# Patient Record
Sex: FEMALE | Race: WHITE | NOT HISPANIC OR LATINO | Employment: OTHER | URBAN - METROPOLITAN AREA
[De-identification: names, ages, dates, MRNs, and addresses within clinical notes are randomized per-mention and may not be internally consistent; named-entity substitution may affect disease eponyms.]

---

## 2017-01-13 ENCOUNTER — OFFICE VISIT (OUTPATIENT)
Dept: ORTHOPEDIC SURGERY | Facility: CLINIC | Age: 72
End: 2017-01-13

## 2017-01-13 VITALS — HEIGHT: 67 IN | TEMPERATURE: 97.8 F | WEIGHT: 133 LBS | BODY MASS INDEX: 20.88 KG/M2

## 2017-01-13 DIAGNOSIS — M25.511 CHRONIC RIGHT SHOULDER PAIN: Primary | ICD-10-CM

## 2017-01-13 DIAGNOSIS — G89.29 CHRONIC RIGHT SHOULDER PAIN: Primary | ICD-10-CM

## 2017-01-13 PROCEDURE — 73030 X-RAY EXAM OF SHOULDER: CPT | Performed by: ORTHOPAEDIC SURGERY

## 2017-01-13 PROCEDURE — 73010 X-RAY EXAM OF SHOULDER BLADE: CPT | Performed by: ORTHOPAEDIC SURGERY

## 2017-01-13 PROCEDURE — 99202 OFFICE O/P NEW SF 15 MIN: CPT | Performed by: ORTHOPAEDIC SURGERY

## 2017-01-13 RX ORDER — METHOCARBAMOL 500 MG/1
500 TABLET, FILM COATED ORAL 4 TIMES DAILY
COMMUNITY
End: 2019-05-14

## 2017-01-13 NOTE — MR AVS SNAPSHOT
Tracey Vines   1/13/2017 11:00 AM   Office Visit    Dept Phone:  [phone]   Encounter #:  [id number]    Provider:  Jonas Nolen MD   Department:  [facility]                Your Full Care Plan              Your Updated Medication List          This list is accurate as of: 1/13/17 11:47 AM.  Always use your most recent med list.                ALPRAZolam 0.25 MG tablet   Commonly known as:  XANAX       atorvastatin 40 MG tablet   Commonly known as:  LIPITOR       carvedilol 12.5 MG tablet   Commonly known as:  COREG       cefdinir 300 MG capsule   Commonly known as:  OMNICEF   2 capsules (at the same time) once a day       digoxin 125 MCG tablet   Commonly known as:  LANOXIN       furosemide 10 MG/ML solution   Commonly known as:  LASIX       losartan 25 MG tablet   Commonly known as:  COZAAR       methocarbamol 500 MG tablet   Commonly known as:  ROBAXIN       spironolactone 25 MG tablet   Commonly known as:  ALDACTONE       venlafaxine  MG 24 hr capsule   Commonly known as:  EFFEXOR-XR       warfarin 2.5 MG tablet   Commonly known as:  COUMADIN               You Were Diagnosed With        Codes Comments    Chronic right shoulder pain    -  Primary ICD-10-CM: M25.511, G89.29  ICD-9-CM: 719.41, 338.29       Instructions     None    Patient Instructions History      Upcoming Appointments     Visit Type Date Time Department    NEW PATIENT 1/13/2017 11:00 AM [facility]    PAP SMEAR/PELVIC EXAM 4/6/2017  1:30 PM [facility]      [employer] Signup     Our records indicate that you have an active [facility] account.    You can view your After Visit Summary by going to [employer] and logging in with your [employer] username and password.  If you don't have a [employer] username and password but a parent or guardian has access to your record, the parent or guardian should login with their own  "[employer] username and password and access your record to view the After Visit Summary.    If you have questions, you can email [email or web address] or call [phone] to talk to our [employer] staff.  Remember, [employer] is NOT to be used for urgent needs.  For medical emergencies, dial 911.               Other Info from Your Visit           Your Appointments     Apr 06, 2017  1:30 PM EDT   Pap Smear/Pelvic Exam with Thomas Rand MD   [facility] (--)    [address] [place] [id number] [phone]              Allergies     No Known Allergies      Reason for Visit     Right Shoulder - Pain           Vital Signs     Temperature Height Weight Last Menstrual Period Body Mass Index Smoking Status    97.8 °F (36.6 °C) 67\" (170.2 cm) 133 lb (60.3 kg) (LMP Unknown) 20.83 kg/m2 Former Smoker      Problems and Diagnoses Noted     Chronic right shoulder pain    -  Primary        "

## 2017-01-15 NOTE — PROGRESS NOTES
Patient: Tracey Vines    YOB: 1945    Medical Record Number: [id number]    Chief Complaints:  Right shoulder pain    History of Present Illness:     71 y.o. female patient who presents for evaluation of her right shoulder.  She tells me she was lifting a crock pot on May 6 when she felt a pop in the front of her right shoulder.  She localizes this pain to the area over the biceps.  He tells me that when she may be a performance she is still having discomfort but that pain is now completely resolved.  She tells me that muscle relaxers seem to help quite a bit.  At present she is asymptomatic.  Denies any complaints or issues whatsoever.    Allergies: No Known Allergies    Home Medications:      Current Outpatient Prescriptions:   •  ALPRAZolam (XANAX) 0.25 MG tablet, Take 0.25 mg by mouth 2 (two) times a day as needed for anxiety., Disp: , Rfl:   •  atorvastatin (LIPITOR) 40 MG tablet, Take 40 mg by mouth daily., Disp: , Rfl:   •  carvedilol (COREG) 12.5 MG tablet, Take 12.5 mg by mouth 2 (two) times a day with meals., Disp: , Rfl:   •  cefdinir (OMNICEF) 300 MG capsule, 2 capsules (at the same time) once a day, Disp: 20 capsule, Rfl: 0  •  digoxin (LANOXIN) 125 MCG tablet, Take 125 mcg by mouth every day., Disp: , Rfl:   •  furosemide (LASIX) 10 MG/ML solution, Take  by mouth daily., Disp: , Rfl:   •  losartan (COZAAR) 25 MG tablet, Take 25 mg by mouth daily., Disp: , Rfl:   •  methocarbamol (ROBAXIN) 500 MG tablet, Take 500 mg by mouth 4 (Four) Times a Day., Disp: , Rfl:   •  spironolactone (ALDACTONE) 25 MG tablet, Take 25 mg by mouth daily., Disp: , Rfl:   •  venlafaxine XR (EFFEXOR-XR) 150 MG 24 hr capsule, Take 150 mg by mouth daily., Disp: , Rfl:   •  warfarin (COUMADIN) 2.5 MG tablet, Take 2.5 mg by mouth daily., Disp: , Rfl:     Past Medical History   Diagnosis Date   • A-fib    • CHF (congestive heart failure)    • Depression    • Disease of thyroid gland    • Hyperlipidemia    •  "Hypertension        Past Surgical History   Procedure Laterality Date   • Thyroid surgery     • Cardiac surgery     • Tonsillectomy     • Cholecystectomy         Social History     Occupational History   • Not on file.     Social History Main Topics   • Smoking status: Former Smoker   • Smokeless tobacco: Not on file   • Alcohol use No   • Drug use: No   • Sexual activity: Not on file      Social History     Social History Narrative       Family History   Problem Relation Age of Onset   • Diabetes Mother    • Heart failure Mother    • Stroke Mother    • Cancer Father        Review of Systems:      Constitutional: Denies fever, shaking or chills   Eyes: Denies change in visual acuity   HEENT: Denies nasal congestion or sore throat   Respiratory: Denies cough or shortness of breath   Cardiovascular: Denies chest pain or edema  Endocrine: Denies tremors, palpitations, intolerance of heat or cold, polyuria, polydipsia.  GI: Denies abdominal pain, nausea, vomiting, bloody stools or diarrhea  : Denies frequency, urgency, incontinence, retention, or nocturia.  Musculoskeletal: Denies numbness tingling or loss of motor function except as above  Integument: Denies rash, lesion or ulceration   Neurologic: Denies headache or focal weakness, deficits  Heme: Denies epistaxis, spontaneous or excessive bleeding, epistaxis, hematuria, melena, fatigue, enlarged or tender lymph nodes.      All other pertinent positives and negatives as noted above in HPI.      Physical Exam: 71 y.o. female    Vitals:    01/13/17 1120   Temp: 97.8 °F (36.6 °C)   Weight: 133 lb (60.3 kg)   Height: 67\" (170.2 cm)       General:  Patient is awake and alert.  Appears in no acute distress or discomfort.    Psych:  Affect and demeanor are appropriate.    Eyes:  Conjunctiva and sclera appear grossly normal.  Eyes track well and EOM seem to be intact.    Ears:  No gross abnormalities.  Hearing adequate for the exam.    Cardiovascular:  Regular rate and " rhythm.    Lungs:  Good chest expansion.  Breathing unlabored.    Lymph:  No palpable masses or adenopathy in the affected extremity    Extremities:  Right shoulder is examined.  Skin is benign.  No swellings or masses.  No tenderness.  Full motion.  No instability.  Good strength with elevation and abduction.  Intact sensation throughout the arm and hand.  Palpable radial pulse         Radiology:   AP, scapular Y, and axillary views of the right shoulder are ordered by myself and reviewed to evaluate the patient's complaint.  No comparison films are immediately available.  The x-rays show no obvious acute abnormalities, lesions, masses, significant degenerative changes, or other concerning findings.  The acromiohumeral interval is normal.  Glenoid version appears normal as well.      Assessment/Plan:  Right shoulder long head of biceps tendon rupture    My best guess is that she ruptured her biceps.  There is no Rubens deformity and her symptoms are completely resolved.  I will release her to follow-up as needed.    Jonas Nolen MD    01/13/2017    CC to Serina Butcher MD

## 2017-04-06 ENCOUNTER — PROCEDURE VISIT (OUTPATIENT)
Dept: OBSTETRICS AND GYNECOLOGY | Age: 72
End: 2017-04-06

## 2017-04-06 VITALS
BODY MASS INDEX: 24.27 KG/M2 | HEIGHT: 63 IN | DIASTOLIC BLOOD PRESSURE: 64 MMHG | WEIGHT: 137 LBS | SYSTOLIC BLOOD PRESSURE: 100 MMHG

## 2017-04-06 DIAGNOSIS — Z12.4 SCREENING FOR CERVICAL CANCER: ICD-10-CM

## 2017-04-06 DIAGNOSIS — E78.00 HYPERCHOLESTEREMIA: ICD-10-CM

## 2017-04-06 DIAGNOSIS — B97.7 HPV IN FEMALE: Primary | ICD-10-CM

## 2017-04-06 DIAGNOSIS — I10 ESSENTIAL HYPERTENSION: ICD-10-CM

## 2017-04-06 DIAGNOSIS — R87.612 LOW GRADE SQUAMOUS INTRAEPITHELIAL LESION ON CYTOLOGIC SMEAR OF CERVIX (LGSIL): ICD-10-CM

## 2017-04-06 PROCEDURE — 99212 OFFICE O/P EST SF 10 MIN: CPT | Performed by: OBSTETRICS & GYNECOLOGY

## 2017-04-06 NOTE — PROGRESS NOTES
Subjective   Tracey Vines is a 72 y.o. female is being seen today for   Chief Complaint   Patient presents with   • Gynecologic Exam     PT HERE FOR 6 MO REP PAP.   .    History of Present Illness  Patient is here for repeat Pap smear.  Last Pap was essentially level to slightly better so get a Pap today.  No other problems or complaints.  She turns 72 in both her sons through the lower part T for her  The following portions of the patient's history were reviewed and updated as appropriate: allergies, current medications, past family history, past medical history, past social history, past surgical history and problem list.    PAST MEDICAL HISTORY  Past Medical History:   Diagnosis Date   • A-fib    • CHF (congestive heart failure)    • Depression    • Disease of thyroid gland    • Hyperlipidemia    • Hypertension      OB History     No data available        Past Surgical History:   Procedure Laterality Date   • CARDIAC SURGERY     • CHOLECYSTECTOMY     • THYROID SURGERY     • TONSILLECTOMY       Family History   Problem Relation Age of Onset   • Diabetes Mother    • Heart failure Mother    • Stroke Mother    • Cancer Father      History   Smoking Status   • Former Smoker   Smokeless Tobacco   • Not on file       Current Outpatient Prescriptions:   •  ALPRAZolam (XANAX) 0.25 MG tablet, Take 0.25 mg by mouth 2 (two) times a day as needed for anxiety., Disp: , Rfl:   •  atorvastatin (LIPITOR) 40 MG tablet, Take 40 mg by mouth daily., Disp: , Rfl:   •  carvedilol (COREG) 12.5 MG tablet, Take 12.5 mg by mouth 2 (two) times a day with meals., Disp: , Rfl:   •  cefdinir (OMNICEF) 300 MG capsule, 2 capsules (at the same time) once a day, Disp: 20 capsule, Rfl: 0  •  digoxin (LANOXIN) 125 MCG tablet, Take 125 mcg by mouth every day., Disp: , Rfl:   •  furosemide (LASIX) 10 MG/ML solution, Take  by mouth daily., Disp: , Rfl:   •  losartan (COZAAR) 25 MG tablet, Take 25 mg by mouth daily., Disp: , Rfl:   •  methocarbamol  (ROBAXIN) 500 MG tablet, Take 500 mg by mouth 4 (Four) Times a Day., Disp: , Rfl:   •  spironolactone (ALDACTONE) 25 MG tablet, Take 25 mg by mouth daily., Disp: , Rfl:   •  venlafaxine XR (EFFEXOR-XR) 150 MG 24 hr capsule, Take 150 mg by mouth daily., Disp: , Rfl:   •  warfarin (COUMADIN) 2.5 MG tablet, Take 2.5 mg by mouth daily., Disp: , Rfl:   Immunization History   Administered Date(s) Administered   • TD Preservative Free 05/06/2016       Review of Systems  Negative  Objective   Physical Exam  Well-developed well-nourished white female no acute distress    Assessment/Plan   Tracey was seen today for gynecologic exam.    Diagnoses and all orders for this visit:    HPV in female  -     IGP, Aptima HPV, Rfx 16 / 18,45    Screening for cervical cancer  -     IGP, Aptima HPV, Rfx 16 / 18,45    Hypercholesteremia    Essential hypertension    Low grade squamous intraepithelial lesion on cytologic smear of cervix (lgsil)      Pap smear done return in 6 months

## 2017-04-08 LAB
CONV .: NORMAL
CYTOLOGIST CVX/VAG CYTO: NORMAL
CYTOLOGY CVX/VAG DOC THIN PREP: NORMAL
DX ICD CODE: NORMAL
HIV 1 & 2 AB SER-IMP: NORMAL
OTHER STN SPEC: NORMAL
PATH REPORT.FINAL DX SPEC: NORMAL
STAT OF ADQ CVX/VAG CYTO-IMP: NORMAL

## 2017-10-10 ENCOUNTER — OFFICE VISIT (OUTPATIENT)
Dept: OBSTETRICS AND GYNECOLOGY | Age: 72
End: 2017-10-10

## 2017-10-10 VITALS — DIASTOLIC BLOOD PRESSURE: 64 MMHG | WEIGHT: 134.2 LBS | BODY MASS INDEX: 23.77 KG/M2 | SYSTOLIC BLOOD PRESSURE: 122 MMHG

## 2017-10-10 DIAGNOSIS — N87.1 MODERATE DYSPLASIA OF CERVIX: Primary | ICD-10-CM

## 2017-10-10 DIAGNOSIS — R87.612 LOW GRADE SQUAMOUS INTRAEPITHELIAL LESION ON CYTOLOGIC SMEAR OF CERVIX (LGSIL): ICD-10-CM

## 2017-10-10 PROCEDURE — 99212 OFFICE O/P EST SF 10 MIN: CPT | Performed by: OBSTETRICS & GYNECOLOGY

## 2017-10-10 RX ORDER — POTASSIUM CHLORIDE 750 MG/1
TABLET, EXTENDED RELEASE ORAL
Refills: 3 | COMMUNITY
Start: 2017-08-25

## 2017-10-10 NOTE — PROGRESS NOTES
Subjective   Tracey Vines is a 72 y.o. female is being seen today for a repeat pap.  Chief Complaint   Patient presents with   • Follow-up     Repeat pap   .    History of Present Illness  Patient is here for repeat Pap smear.  Last Pap was negative this she has had mild dysplasia fairly recently.  She has no other problems or questions today.  The following portions of the patient's history were reviewed and updated as appropriate: allergies, current medications, past family history, past medical history, past social history, past surgical history and problem list.    Vitals:    10/10/17 1409   BP: 122/64         PAST MEDICAL HISTORY  Past Medical History:   Diagnosis Date   • A-fib    • CHF (congestive heart failure)    • Depression    • Disease of thyroid gland    • Hyperlipidemia    • Hypertension      OB History     No data available        Past Surgical History:   Procedure Laterality Date   • CARDIAC SURGERY     • CHOLECYSTECTOMY     • THYROID SURGERY     • TONSILLECTOMY       Family History   Problem Relation Age of Onset   • Diabetes Mother    • Heart failure Mother    • Stroke Mother    • Cancer Father      History   Smoking Status   • Former Smoker   Smokeless Tobacco   • Never Used       Current Outpatient Prescriptions:   •  ALPRAZolam (XANAX) 0.25 MG tablet, Take 0.25 mg by mouth 2 (two) times a day as needed for anxiety., Disp: , Rfl:   •  atorvastatin (LIPITOR) 40 MG tablet, Take 40 mg by mouth daily., Disp: , Rfl:   •  carvedilol (COREG) 12.5 MG tablet, Take 12.5 mg by mouth 2 (two) times a day with meals., Disp: , Rfl:   •  cefdinir (OMNICEF) 300 MG capsule, 2 capsules (at the same time) once a day, Disp: 20 capsule, Rfl: 0  •  digoxin (LANOXIN) 125 MCG tablet, Take 125 mcg by mouth every day., Disp: , Rfl:   •  furosemide (LASIX) 10 MG/ML solution, Take  by mouth daily., Disp: , Rfl:   •  losartan (COZAAR) 25 MG tablet, Take 25 mg by mouth daily., Disp: , Rfl:   •  methocarbamol (ROBAXIN) 500 MG  tablet, Take 500 mg by mouth 4 (Four) Times a Day., Disp: , Rfl:   •  potassium chloride (K-DUR,KLOR-CON) 10 MEQ CR tablet, TK 1 T PO QD, Disp: , Rfl: 3  •  spironolactone (ALDACTONE) 25 MG tablet, Take 25 mg by mouth daily., Disp: , Rfl:   •  venlafaxine XR (EFFEXOR-XR) 150 MG 24 hr capsule, Take 150 mg by mouth daily., Disp: , Rfl:   •  warfarin (COUMADIN) 2.5 MG tablet, Take 2.5 mg by mouth daily., Disp: , Rfl:   Immunization History   Administered Date(s) Administered   • TD Preservative Free 05/06/2016       Review of Systems  Negative  Objective   Physical Exam  Pap smear done    Assessment/Plan   Tracey was seen today for follow-up.    Diagnoses and all orders for this visit:    Moderate dysplasia of cervix  -     Pap IG (Image Guided) - ThinPrep Vial, Cervix    Low grade squamous intraepithelial lesion on cytologic smear of cervix (LGSIL)    We will also plan to get a mammogram today and I'll have her come back in year if this Pap is normal.

## 2017-10-12 LAB
CYTOLOGIST CVX/VAG CYTO: NORMAL
CYTOLOGY CVX/VAG DOC THIN PREP: NORMAL
DX ICD CODE: NORMAL
HIV 1 & 2 AB SER-IMP: NORMAL
OTHER STN SPEC: NORMAL
PATH REPORT.FINAL DX SPEC: NORMAL
STAT OF ADQ CVX/VAG CYTO-IMP: NORMAL

## 2017-10-13 ENCOUNTER — TELEPHONE (OUTPATIENT)
Dept: OBSTETRICS AND GYNECOLOGY | Age: 72
End: 2017-10-13

## 2017-10-13 NOTE — TELEPHONE ENCOUNTER
----- Message from Thomas Rand MD sent at 10/12/2017  4:17 PM EDT -----  Tell patient Pap normal see back in 1 year

## 2018-05-22 ENCOUNTER — TELEPHONE (OUTPATIENT)
Dept: OBSTETRICS AND GYNECOLOGY | Age: 73
End: 2018-05-22

## 2018-05-22 RX ORDER — METRONIDAZOLE 7.5 MG/G
GEL VAGINAL DAILY
Qty: 1 TUBE | Refills: 0 | Status: SHIPPED | OUTPATIENT
Start: 2018-05-22 | End: 2018-05-27

## 2018-10-16 ENCOUNTER — OFFICE VISIT (OUTPATIENT)
Dept: OBSTETRICS AND GYNECOLOGY | Age: 73
End: 2018-10-16

## 2018-10-16 VITALS
DIASTOLIC BLOOD PRESSURE: 60 MMHG | SYSTOLIC BLOOD PRESSURE: 104 MMHG | BODY MASS INDEX: 21.35 KG/M2 | WEIGHT: 136 LBS | HEIGHT: 67 IN

## 2018-10-16 DIAGNOSIS — N95.0 POSTMENOPAUSAL BLEEDING: ICD-10-CM

## 2018-10-16 DIAGNOSIS — N87.9 CERVICAL DYSPLASIA: ICD-10-CM

## 2018-10-16 DIAGNOSIS — R87.613 HIGH GRADE SQUAMOUS INTRAEPITHELIAL CERVICAL DYSPLASIA: ICD-10-CM

## 2018-10-16 DIAGNOSIS — Z00.00 ANNUAL PHYSICAL EXAM: ICD-10-CM

## 2018-10-16 DIAGNOSIS — Z11.51 SCREENING FOR HPV (HUMAN PAPILLOMAVIRUS): ICD-10-CM

## 2018-10-16 DIAGNOSIS — E03.9 ACQUIRED HYPOTHYROIDISM: Primary | ICD-10-CM

## 2018-10-16 DIAGNOSIS — Z01.419 WELL WOMAN EXAM WITH ROUTINE GYNECOLOGICAL EXAM: ICD-10-CM

## 2018-10-16 PROBLEM — I35.1 AORTIC REGURGITATION: Status: ACTIVE | Noted: 2018-10-16

## 2018-10-16 PROBLEM — Z98.890 H/O MITRAL VALVE REPAIR: Status: ACTIVE | Noted: 2017-05-24

## 2018-10-16 PROBLEM — I10 BENIGN ESSENTIAL HTN: Status: ACTIVE | Noted: 2018-10-16

## 2018-10-16 PROBLEM — I27.20 PULMONARY HYPERTENSION (HCC): Status: ACTIVE | Noted: 2018-10-16

## 2018-10-16 PROBLEM — I77.810 DILATED AORTIC ROOT (HCC): Status: ACTIVE | Noted: 2018-10-16

## 2018-10-16 PROBLEM — I25.10 CORONARY ARTERY DISEASE: Status: ACTIVE | Noted: 2018-10-16

## 2018-10-16 PROBLEM — I65.29 CAROTID ARTERY STENOSIS: Status: ACTIVE | Noted: 2018-10-16

## 2018-10-16 PROBLEM — D64.9 ANEMIA: Status: ACTIVE | Noted: 2018-10-16

## 2018-10-16 PROBLEM — Z95.5 HISTORY OF CORONARY ARTERY STENT PLACEMENT: Status: ACTIVE | Noted: 2017-05-24

## 2018-10-16 PROBLEM — Z98.890 H/O TRICUSPID VALVE REPAIR: Status: ACTIVE | Noted: 2017-05-24

## 2018-10-16 PROBLEM — I77.9 CAROTID ARTERY DISEASE (HCC): Status: ACTIVE | Noted: 2018-10-16

## 2018-10-16 PROCEDURE — 99397 PER PM REEVAL EST PAT 65+ YR: CPT | Performed by: OBSTETRICS & GYNECOLOGY

## 2018-10-16 RX ORDER — INFLUENZA A VIRUS A/MICHIGAN/45/2015 X-275 (H1N1) ANTIGEN (FORMALDEHYDE INACTIVATED), INFLUENZA A VIRUS A/SINGAPORE/INFIMH-16-0019/2016 IVR-186 (H3N2) ANTIGEN (FORMALDEHYDE INACTIVATED), AND INFLUENZA B VIRUS B/MARYLAND/15/2016 BX-69A (A B/COLORADO/6/2017-LIKE VIRUS) ANTIGEN (FORMALDEHYDE INACTIVATED) 60; 60; 60 UG/.5ML; UG/.5ML; UG/.5ML
INJECTION, SUSPENSION INTRAMUSCULAR
Refills: 0 | COMMUNITY
Start: 2018-10-09 | End: 2019-05-14

## 2018-10-16 RX ORDER — LEVOTHYROXINE SODIUM 112 UG/1
112 TABLET ORAL DAILY
COMMUNITY
Start: 2018-01-08 | End: 2019-09-16 | Stop reason: ALTCHOICE

## 2018-10-16 NOTE — PROGRESS NOTES
Subjective   Tracey Vines is a 73 y.o. female is being seen today for   Chief Complaint   Patient presents with   • Gynecologic Exam     AE today.  2017, neg pap, neg HR-HPV (hx of LGSIL).  MG is due.  PM no HRT. C-scope 2016.    .    History of Present Illness patient is here for annual check and a repeat Pap smear.  She has a history of abnormal Paps and HPV the last one to 2 have been normal.  Complaint is the last 4 months she's had lid little bit of a bloody discharge.  No cramps no other problems not sure was coming from both from the vagina but it could be bladder.  I did talk about using tampons tell a difference.  But because that we'll get an ultrasound.  She is unaware a an anticoagulant and have an ultrasound to in half years ago there was very normal.  No other complaints.  Her heart is very stable with his multiple problems.  The following portions of the patient's history were reviewed and updated as appropriate: allergies, current medications, past family history, past medical history, past social history, past surgical history and problem list.    Vitals:    10/16/18 1407   BP: 104/60       PAST MEDICAL HISTORY  Past Medical History:   Diagnosis Date   • A-fib (CMS/HCC)    • CHF (congestive heart failure) (CMS/HCC)    • Depression    • Disease of thyroid gland    • Hyperlipidemia    • Hypertension      OB History      Para Term  AB Living    3 2 2   1 2    SAB TAB Ectopic Molar Multiple Live Births    1         2        Past Surgical History:   Procedure Laterality Date   • BREAST BIOPSY     • CARDIAC SURGERY     • CHOLECYSTECTOMY     • THYROID SURGERY     • TONSILLECTOMY       Family History   Problem Relation Age of Onset   • Diabetes Mother    • Heart failure Mother    • Stroke Mother    • Cancer Father    • No Known Problems Sister    • No Known Problems Brother    • No Known Problems Son    • No Known Problems Maternal Grandmother    • No Known Problems Paternal Grandmother    •  No Known Problems Maternal Aunt    • No Known Problems Paternal Aunt    • No Known Problems Son    • BRCA 1/2 Neg Hx    • Breast cancer Neg Hx    • Colon cancer Neg Hx    • Endometrial cancer Neg Hx    • Ovarian cancer Neg Hx      History   Smoking Status   • Former Smoker   Smokeless Tobacco   • Never Used       Current Outpatient Prescriptions:   •  atorvastatin (LIPITOR) 40 MG tablet, Take 40 mg by mouth daily., Disp: , Rfl:   •  carvedilol (COREG) 12.5 MG tablet, Take 12.5 mg by mouth 2 (two) times a day with meals., Disp: , Rfl:   •  digoxin (LANOXIN) 125 MCG tablet, Take 125 mcg by mouth every day., Disp: , Rfl:   •  diltiaZEM (CARDIZEM) 30 MG tablet, TAKE 1 TABLET BY MOUTH THREE TIMES DAILY, Disp: , Rfl:   •  furosemide (LASIX) 10 MG/ML solution, Take  by mouth daily., Disp: , Rfl:   •  levothyroxine (SYNTHROID, LEVOTHROID) 112 MCG tablet, Take 112 mcg by mouth Daily., Disp: , Rfl:   •  losartan (COZAAR) 25 MG tablet, Take 25 mg by mouth daily., Disp: , Rfl:   •  potassium chloride (K-DUR,KLOR-CON) 10 MEQ CR tablet, TK 1 T PO QD, Disp: , Rfl: 3  •  spironolactone (ALDACTONE) 25 MG tablet, Take 25 mg by mouth daily., Disp: , Rfl:   •  venlafaxine XR (EFFEXOR-XR) 150 MG 24 hr capsule, Take 150 mg by mouth daily., Disp: , Rfl:   •  warfarin (COUMADIN) 2.5 MG tablet, Take 2.5 mg by mouth daily., Disp: , Rfl:   •  methocarbamol (ROBAXIN) 500 MG tablet, Take 500 mg by mouth 4 (Four) Times a Day., Disp: , Rfl:   Immunization History   Administered Date(s) Administered   • Pneumococcal Polysaccharide (PPSV23) 12/26/2017   • TD Preservative Free 05/06/2016       Review of Systems   Constitutional: Negative for chills, fatigue, fever and unexpected weight change.   Respiratory: Negative for shortness of breath and wheezing.    Cardiovascular: Negative for chest pain.   Gastrointestinal: Negative for abdominal distention, abdominal pain, blood in stool, constipation, diarrhea and nausea.   Genitourinary: Positive for  vaginal bleeding. Negative for difficulty urinating, dyspareunia, dysuria, frequency, hematuria, menstrual problem, pelvic pain, urgency and vaginal discharge.   Skin: Negative for rash.       Objective   Physical Exam   Constitutional: She is oriented to person, place, and time. Vital signs are normal. She appears well-developed and well-nourished.   Neck: No thyromegaly present.   Cardiovascular: Normal rate, regular rhythm and normal heart sounds.    Pulmonary/Chest: Effort normal. Right breast exhibits no inverted nipple, no mass, no nipple discharge, no skin change and no tenderness. Left breast exhibits no inverted nipple, no mass, no nipple discharge, no skin change and no tenderness. Breasts are symmetrical. There is no breast swelling.   Abdominal: Soft.   Genitourinary: Vagina normal and uterus normal. No breast tenderness, discharge or bleeding. Pelvic exam was performed with patient supine. No labial fusion. There is no rash, tenderness, lesion or injury on the right labia. There is no rash, tenderness, lesion or injury on the left labia. Cervix exhibits no motion tenderness, no discharge and no friability. Right adnexum displays no mass, no tenderness and no fullness. Left adnexum displays no mass, no tenderness and no fullness.   Neurological: She is alert and oriented to person, place, and time.   Psychiatric: She has a normal mood and affect.   Vitals reviewed.        Assessment/Plan   Tracey was seen today for gynecologic exam.    Diagnoses and all orders for this visit:    Acquired hypothyroidism    Screening for HPV (human papillomavirus)  -     PapIG, HPV, Rfx 16 / 18    Well woman exam with routine gynecological exam  -     PapIG, HPV, Rfx 16 / 18    Annual physical exam    Postmenopausal bleeding      Normal exam today.  Pap smear done today.  We will schedule a an ultrasound.  Mammograms also schedule.  Her PCP does her bone densities and I think she is due for that.  The colonoscopy was in 16.   Pretty good on exercise bowels and bladder work well there is no other new family history.  Come back in a year but can back for the ultrasound in the meantime

## 2018-10-18 LAB
CYTOLOGIST CVX/VAG CYTO: ABNORMAL
CYTOLOGY CVX/VAG DOC THIN PREP: ABNORMAL
DX ICD CODE: ABNORMAL
DX ICD CODE: ABNORMAL
HIV 1 & 2 AB SER-IMP: ABNORMAL
HPV I/H RISK 1 DNA CVX QL PROBE+SIG AMP: POSITIVE
OTHER STN SPEC: ABNORMAL
PATH REPORT.FINAL DX SPEC: ABNORMAL
PATHOLOGIST CVX/VAG CYTO: ABNORMAL
RECOM F/U CVX/VAG CYTO: ABNORMAL
STAT OF ADQ CVX/VAG CYTO-IMP: ABNORMAL

## 2018-10-22 ENCOUNTER — APPOINTMENT (OUTPATIENT)
Dept: WOMENS IMAGING | Facility: HOSPITAL | Age: 73
End: 2018-10-22

## 2018-10-22 PROCEDURE — 77067 SCR MAMMO BI INCL CAD: CPT | Performed by: RADIOLOGY

## 2018-10-22 PROCEDURE — 77063 BREAST TOMOSYNTHESIS BI: CPT | Performed by: RADIOLOGY

## 2018-10-26 ENCOUNTER — TELEPHONE (OUTPATIENT)
Dept: OBSTETRICS AND GYNECOLOGY | Age: 73
End: 2018-10-26

## 2018-11-12 ENCOUNTER — PROCEDURE VISIT (OUTPATIENT)
Dept: OBSTETRICS AND GYNECOLOGY | Age: 73
End: 2018-11-12

## 2018-11-12 VITALS
DIASTOLIC BLOOD PRESSURE: 64 MMHG | BODY MASS INDEX: 21.66 KG/M2 | HEIGHT: 67 IN | WEIGHT: 138 LBS | SYSTOLIC BLOOD PRESSURE: 102 MMHG

## 2018-11-12 DIAGNOSIS — R87.810 CERVICAL HIGH RISK HPV (HUMAN PAPILLOMAVIRUS) TEST POSITIVE: ICD-10-CM

## 2018-11-12 DIAGNOSIS — R87.618 ABNORMAL PAPANICOLAOU SMEAR OF CERVIX WITH POSITIVE HUMAN PAPILLOMA VIRUS (HPV) TEST: Primary | ICD-10-CM

## 2018-11-12 DIAGNOSIS — R87.612 LOW GRADE SQUAMOUS INTRAEPITHELIAL LESION ON CYTOLOGIC SMEAR OF CERVIX (LGSIL): ICD-10-CM

## 2018-11-12 PROCEDURE — 57452 EXAM OF CERVIX W/SCOPE: CPT | Performed by: OBSTETRICS & GYNECOLOGY

## 2018-11-12 NOTE — PROGRESS NOTES
Subjective   Tracey Vines is a 73 y.o. female is being seen today for   Chief Complaint   Patient presents with   • Follow-up     Colposcopy , LGSIL, postivie HR-HPV on 10/16/2018   .    History of Present Illness  Patient is here for a colposcopy for a Pap that showed mild dysplasia with HPV.  She's had this number of years ago and had a couple negative Paps.  So explained again about HPV and Pap smears.  The following portions of the patient's history were reviewed and updated as appropriate: allergies, current medications, past family history, past medical history, past social history, past surgical history and problem list.    Vitals:    18 1527   BP: 102/64         PAST MEDICAL HISTORY  Past Medical History:   Diagnosis Date   • A-fib (CMS/HCC)    • CHF (congestive heart failure) (CMS/HCC)    • Depression    • Disease of thyroid gland    • Hyperlipidemia    • Hypertension      OB History      Para Term  AB Living    3 2 2   1 2    SAB TAB Ectopic Molar Multiple Live Births    1         2        Past Surgical History:   Procedure Laterality Date   • BREAST BIOPSY     • CARDIAC SURGERY     • CHOLECYSTECTOMY     • THYROID SURGERY     • TONSILLECTOMY       Family History   Problem Relation Age of Onset   • Diabetes Mother    • Heart failure Mother    • Stroke Mother    • Cancer Father    • No Known Problems Sister    • No Known Problems Brother    • No Known Problems Son    • No Known Problems Maternal Grandmother    • No Known Problems Paternal Grandmother    • No Known Problems Maternal Aunt    • No Known Problems Paternal Aunt    • No Known Problems Son    • BRCA 1/2 Neg Hx    • Breast cancer Neg Hx    • Colon cancer Neg Hx    • Endometrial cancer Neg Hx    • Ovarian cancer Neg Hx      Social History     Tobacco Use   Smoking Status Former Smoker   Smokeless Tobacco Never Used       Current Outpatient Medications:   •  atorvastatin (LIPITOR) 40 MG tablet, Take 40 mg by mouth daily., Disp: ,  Rfl:   •  carvedilol (COREG) 12.5 MG tablet, Take 12.5 mg by mouth 2 (two) times a day with meals., Disp: , Rfl:   •  digoxin (LANOXIN) 125 MCG tablet, Take 125 mcg by mouth every day., Disp: , Rfl:   •  diltiaZEM (CARDIZEM) 30 MG tablet, TAKE 1 TABLET BY MOUTH THREE TIMES DAILY, Disp: , Rfl:   •  FLUZONE HIGH-DOSE 0.5 ML suspension prefilled syringe injection, ADM 0.5ML IM UTD, Disp: , Rfl: 0  •  furosemide (LASIX) 10 MG/ML solution, Take  by mouth daily., Disp: , Rfl:   •  levothyroxine (SYNTHROID, LEVOTHROID) 112 MCG tablet, Take 112 mcg by mouth Daily., Disp: , Rfl:   •  losartan (COZAAR) 25 MG tablet, Take 25 mg by mouth daily., Disp: , Rfl:   •  methocarbamol (ROBAXIN) 500 MG tablet, Take 500 mg by mouth 4 (Four) Times a Day., Disp: , Rfl:   •  potassium chloride (K-DUR,KLOR-CON) 10 MEQ CR tablet, TK 1 T PO QD, Disp: , Rfl: 3  •  spironolactone (ALDACTONE) 25 MG tablet, Take 25 mg by mouth daily., Disp: , Rfl:   •  venlafaxine XR (EFFEXOR-XR) 150 MG 24 hr capsule, Take 150 mg by mouth daily., Disp: , Rfl:   •  warfarin (COUMADIN) 2.5 MG tablet, Take 2.5 mg by mouth daily., Disp: , Rfl:   Immunization History   Administered Date(s) Administered   • Pneumococcal Polysaccharide (PPSV23) 12/26/2017   • TD Preservative Free 05/06/2016       Review of Systems  Negative  Objective   Physical Exam  Colposcopy was performed with the routine fashion with acetic acid.  The external ectocervix looked totally clean and the cervix was stenotic so I did perform a Pap smear of the endocervix.  Patient tolerated the procedure very well    Assessment/Plan   Tracey was seen today for follow-up.    Diagnoses and all orders for this visit:    Abnormal Papanicolaou smear of cervix with positive human papilloma virus (HPV) test  -     Colposcopy    Low grade squamous intraepithelial lesion on cytologic smear of cervix (LGSIL)    Other orders  -     Pap IG, Rfx HPV All Pth      Patient results to be communicated otherwise return in 6  months for a Pap smear

## 2018-11-15 LAB
CYTOLOGIST CVX/VAG CYTO: NORMAL
CYTOLOGY CVX/VAG DOC THIN PREP: NORMAL
DX ICD CODE: NORMAL
HIV 1 & 2 AB SER-IMP: NORMAL
OTHER STN SPEC: NORMAL
PATH REPORT.FINAL DX SPEC: NORMAL
PATHOLOGIST CVX/VAG CYTO: NORMAL
STAT OF ADQ CVX/VAG CYTO-IMP: NORMAL

## 2018-12-10 ENCOUNTER — PROCEDURE VISIT (OUTPATIENT)
Dept: OBSTETRICS AND GYNECOLOGY | Age: 73
End: 2018-12-10

## 2018-12-10 ENCOUNTER — OFFICE VISIT (OUTPATIENT)
Dept: OBSTETRICS AND GYNECOLOGY | Age: 73
End: 2018-12-10

## 2018-12-10 VITALS — SYSTOLIC BLOOD PRESSURE: 104 MMHG | BODY MASS INDEX: 21.46 KG/M2 | DIASTOLIC BLOOD PRESSURE: 72 MMHG | WEIGHT: 137 LBS

## 2018-12-10 DIAGNOSIS — R31.1 BENIGN ESSENTIAL MICROSCOPIC HEMATURIA: Primary | ICD-10-CM

## 2018-12-10 DIAGNOSIS — N95.0 PMB (POSTMENOPAUSAL BLEEDING): Primary | ICD-10-CM

## 2018-12-10 DIAGNOSIS — N95.0 POST-MENOPAUSAL BLEEDING: ICD-10-CM

## 2018-12-10 PROCEDURE — 99213 OFFICE O/P EST LOW 20 MIN: CPT | Performed by: OBSTETRICS & GYNECOLOGY

## 2018-12-10 PROCEDURE — 76830 TRANSVAGINAL US NON-OB: CPT | Performed by: OBSTETRICS & GYNECOLOGY

## 2018-12-10 NOTE — PROGRESS NOTES
Subjective   Tracey Vines is a 73 y.o. female is being seen today for   Chief Complaint   Patient presents with   • Follow-up     U/S with Arpita  @ 3:30.  Irregular discharge.    .    History of Present Illness  Patient is here for a follow-up there is having an ultrasound done for her vaginal bloody discharge.  See the prior note.  She tried the tampon trip to see was coming from the bladder but is too discomforting she couldn't do that.  The following portions of the patient's history were reviewed and updated as appropriate: allergies, current medications, past family history, past medical history, past social history, past surgical history and problem list.    Vitals:    12/10/18 1538   BP: 104/72         PAST MEDICAL HISTORY  Past Medical History:   Diagnosis Date   • A-fib (CMS/HCC)    • CHF (congestive heart failure) (CMS/HCC)    • Depression    • Disease of thyroid gland    • Hyperlipidemia    • Hypertension      OB History      Para Term  AB Living    3 2 2   1 2    SAB TAB Ectopic Molar Multiple Live Births    1         2        Past Surgical History:   Procedure Laterality Date   • BREAST BIOPSY     • CARDIAC SURGERY     • CHOLECYSTECTOMY     • THYROID SURGERY     • TONSILLECTOMY       Family History   Problem Relation Age of Onset   • Diabetes Mother    • Heart failure Mother    • Stroke Mother    • Cancer Father    • No Known Problems Sister    • No Known Problems Brother    • No Known Problems Son    • No Known Problems Maternal Grandmother    • No Known Problems Paternal Grandmother    • No Known Problems Maternal Aunt    • No Known Problems Paternal Aunt    • No Known Problems Son    • BRCA 1/2 Neg Hx    • Breast cancer Neg Hx    • Colon cancer Neg Hx    • Endometrial cancer Neg Hx    • Ovarian cancer Neg Hx      Social History     Tobacco Use   Smoking Status Former Smoker   Smokeless Tobacco Never Used       Current Outpatient Medications:   •  atorvastatin (LIPITOR) 40 MG tablet,  Take 40 mg by mouth daily., Disp: , Rfl:   •  carvedilol (COREG) 12.5 MG tablet, Take 12.5 mg by mouth 2 (two) times a day with meals., Disp: , Rfl:   •  digoxin (LANOXIN) 125 MCG tablet, Take 125 mcg by mouth every day., Disp: , Rfl:   •  diltiaZEM (CARDIZEM) 30 MG tablet, TAKE 1 TABLET BY MOUTH THREE TIMES DAILY, Disp: , Rfl:   •  FLUZONE HIGH-DOSE 0.5 ML suspension prefilled syringe injection, ADM 0.5ML IM UTD, Disp: , Rfl: 0  •  furosemide (LASIX) 10 MG/ML solution, Take  by mouth daily., Disp: , Rfl:   •  levothyroxine (SYNTHROID, LEVOTHROID) 112 MCG tablet, Take 112 mcg by mouth Daily., Disp: , Rfl:   •  losartan (COZAAR) 25 MG tablet, Take 25 mg by mouth daily., Disp: , Rfl:   •  methocarbamol (ROBAXIN) 500 MG tablet, Take 500 mg by mouth 4 (Four) Times a Day., Disp: , Rfl:   •  potassium chloride (K-DUR,KLOR-CON) 10 MEQ CR tablet, TK 1 T PO QD, Disp: , Rfl: 3  •  spironolactone (ALDACTONE) 25 MG tablet, Take 25 mg by mouth daily., Disp: , Rfl:   •  venlafaxine XR (EFFEXOR-XR) 150 MG 24 hr capsule, Take 150 mg by mouth daily., Disp: , Rfl:   •  warfarin (COUMADIN) 2.5 MG tablet, Take 2.5 mg by mouth daily., Disp: , Rfl:   Immunization History   Administered Date(s) Administered   • Pneumococcal Polysaccharide (PPSV23) 12/26/2017   • TD Preservative Free 05/06/2016       Review of Systems  Negative other than bloody discharge  Objective   Physical Exam  Well-developed well-nourished female no acute distress    Assessment/Plan   Tracey was seen today for follow-up.    Diagnoses and all orders for this visit:    Benign essential microscopic hematuria  -     Ambulatory Referral to Urology    Post-menopausal bleeding      Transvaginal ultrasound was performed and I was in the room the whole time discussed the results with the patient.  Everything was within normal limits.  The lining of the uterus was 1.2 mm looked smooth.  Ovaries not able to be seen is also bowel movement going on.  The patient was reassured and  I will refer her to urology.

## 2019-05-14 ENCOUNTER — OFFICE VISIT (OUTPATIENT)
Dept: OBSTETRICS AND GYNECOLOGY | Age: 74
End: 2019-05-14

## 2019-05-14 VITALS
BODY MASS INDEX: 21.5 KG/M2 | DIASTOLIC BLOOD PRESSURE: 64 MMHG | SYSTOLIC BLOOD PRESSURE: 102 MMHG | HEIGHT: 67 IN | WEIGHT: 137 LBS

## 2019-05-14 DIAGNOSIS — R87.810 CERVICAL HIGH RISK HPV (HUMAN PAPILLOMAVIRUS) TEST POSITIVE: ICD-10-CM

## 2019-05-14 DIAGNOSIS — R87.610 ATYPICAL SQUAMOUS CELLS OF UNDETERMINED SIGNIFICANCE ON CYTOLOGIC SMEAR OF CERVIX (ASC-US): Primary | ICD-10-CM

## 2019-05-14 DIAGNOSIS — Z12.4 PAP SMEAR FOR CERVICAL CANCER SCREENING: ICD-10-CM

## 2019-05-14 PROBLEM — I48.19 PERSISTENT ATRIAL FIBRILLATION (HCC): Status: ACTIVE | Noted: 2019-05-14

## 2019-05-14 PROCEDURE — 99212 OFFICE O/P EST SF 10 MIN: CPT | Performed by: OBSTETRICS & GYNECOLOGY

## 2019-05-14 RX ORDER — FUROSEMIDE 40 MG/1
40 TABLET ORAL 2 TIMES DAILY
COMMUNITY

## 2019-05-14 RX ORDER — ASPIRIN 81 MG/1
TABLET ORAL
COMMUNITY
Start: 2005-02-08

## 2019-05-14 RX ORDER — CHOLECALCIFEROL (VITAMIN D3) 125 MCG
CAPSULE ORAL
COMMUNITY
Start: 2017-03-28

## 2019-05-14 NOTE — PROGRESS NOTES
Subjective   Tracey Vines is a 74 y.o. female is being seen today for No chief complaint on file.  .    History of Present Illness  Patient is here for a repeat of her Pap smear.  She has had irregular Paps for little while now and we we will repeat a Pap today.  No other complaints.  She did mention that she has occasional discharge little dark but she has been looked at thoroughly by urology and myself and ultrasound was totally normal.  The following portions of the patient's history were reviewed and updated as appropriate: allergies, current medications, past family history, past medical history, past social history, past surgical history and problem list.    There were no vitals filed for this visit.      PAST MEDICAL HISTORY  Past Medical History:   Diagnosis Date   • A-fib (CMS/HCC)    • CHF (congestive heart failure) (CMS/HCC)    • Depression    • Disease of thyroid gland    • Hyperlipidemia    • Hypertension      OB History      Para Term  AB Living    3 2 2   1 2    SAB TAB Ectopic Molar Multiple Live Births    1         2        Past Surgical History:   Procedure Laterality Date   • BREAST BIOPSY     • CARDIAC SURGERY     • CHOLECYSTECTOMY     • THYROID SURGERY     • TONSILLECTOMY       Family History   Problem Relation Age of Onset   • Diabetes Mother    • Heart failure Mother    • Stroke Mother    • Cancer Father    • No Known Problems Sister    • No Known Problems Brother    • No Known Problems Son    • No Known Problems Maternal Grandmother    • No Known Problems Paternal Grandmother    • No Known Problems Maternal Aunt    • No Known Problems Paternal Aunt    • No Known Problems Son    • BRCA 1/2 Neg Hx    • Breast cancer Neg Hx    • Colon cancer Neg Hx    • Endometrial cancer Neg Hx    • Ovarian cancer Neg Hx      Social History     Tobacco Use   Smoking Status Former Smoker   Smokeless Tobacco Never Used       Current Outpatient Medications:   •  aspirin (ASPIR-LOW) 81 MG EC tablet,  , Disp: , Rfl:   •  atorvastatin (LIPITOR) 40 MG tablet, Take 40 mg by mouth daily., Disp: , Rfl:   •  Bioflavonoid Products (BRITTANY-C) 500-200-60 MG tablet, , Disp: , Rfl:   •  carvedilol (COREG) 12.5 MG tablet, Take 12.5 mg by mouth 2 (two) times a day with meals., Disp: , Rfl:   •  Cholecalciferol (VITAMIN D) 1000 units tablet, , Disp: , Rfl:   •  digoxin (LANOXIN) 125 MCG tablet, Take 125 mcg by mouth every day., Disp: , Rfl:   •  diltiaZEM (CARDIZEM) 30 MG tablet, TAKE 1 TABLET BY MOUTH THREE TIMES DAILY, Disp: , Rfl:   •  furosemide (LASIX) 40 MG tablet, Take 40 mg by mouth 2 (Two) Times a Day., Disp: , Rfl:   •  levothyroxine (SYNTHROID, LEVOTHROID) 112 MCG tablet, Take 112 mcg by mouth Daily., Disp: , Rfl:   •  losartan (COZAAR) 25 MG tablet, Take 25 mg by mouth daily., Disp: , Rfl:   •  melatonin 5 MG tablet tablet, , Disp: , Rfl:   •  potassium chloride (K-DUR,KLOR-CON) 10 MEQ CR tablet, TK 1 T PO QD, Disp: , Rfl: 3  •  spironolactone (ALDACTONE) 25 MG tablet, Take 25 mg by mouth daily., Disp: , Rfl:   •  venlafaxine XR (EFFEXOR-XR) 150 MG 24 hr capsule, Take 150 mg by mouth daily., Disp: , Rfl:   •  warfarin (COUMADIN) 2.5 MG tablet, Take 2.5 mg by mouth daily., Disp: , Rfl:   •  methylcellulose, Laxative, (CITRUCEL) 500 MG tablet tablet, , Disp: , Rfl:   Immunization History   Administered Date(s) Administered   • Pneumococcal Polysaccharide (PPSV23) 12/26/2017   • TD Preservative Free 05/06/2016       Review of Systems  Negative  Objective   Physical Exam    Pap smear done  Assessment/Plan   Diagnoses and all orders for this visit:    Atypical squamous cells of undetermined significance on cytologic smear of cervix (ASC-US)      Pap smear done back in 6 months

## 2019-05-20 LAB
CONV .: ABNORMAL
CYTOLOGIST CVX/VAG CYTO: ABNORMAL
CYTOLOGY CVX/VAG DOC CYTO: ABNORMAL
CYTOLOGY CVX/VAG DOC THIN PREP: ABNORMAL
DX ICD CODE: ABNORMAL
DX ICD CODE: ABNORMAL
HIV 1 & 2 AB SER-IMP: ABNORMAL
HPV I/H RISK 1 DNA CVX QL PROBE+SIG AMP: POSITIVE
OTHER STN SPEC: ABNORMAL
PATHOLOGIST CVX/VAG CYTO: ABNORMAL
STAT OF ADQ CVX/VAG CYTO-IMP: ABNORMAL

## 2019-05-29 ENCOUNTER — TELEPHONE (OUTPATIENT)
Dept: OBSTETRICS AND GYNECOLOGY | Age: 74
End: 2019-05-29

## 2019-05-29 NOTE — TELEPHONE ENCOUNTER
----- Message from Thomas Rand MD sent at 5/28/2019 12:51 PM EDT -----  Please tell patient this that she has level 3 again with a Pap which is mild dysplasia and with her positive HPV I need to see her for colposcopy so please set that up.

## 2019-06-24 ENCOUNTER — OFFICE VISIT (OUTPATIENT)
Dept: OBSTETRICS AND GYNECOLOGY | Age: 74
End: 2019-06-24

## 2019-06-24 DIAGNOSIS — R87.619 ABNORMAL CYTOLOGICAL FINDING IN SPECIMEN FROM CERVIX UTERI: ICD-10-CM

## 2019-06-24 DIAGNOSIS — Z11.51 SCREENING FOR HPV (HUMAN PAPILLOMAVIRUS): ICD-10-CM

## 2019-06-24 DIAGNOSIS — R87.810 CERVICAL HIGH RISK HPV (HUMAN PAPILLOMAVIRUS) TEST POSITIVE: Primary | ICD-10-CM

## 2019-06-24 DIAGNOSIS — Z12.4 PAP SMEAR FOR CERVICAL CANCER SCREENING: ICD-10-CM

## 2019-06-24 DIAGNOSIS — R87.612 LOW GRADE SQUAMOUS INTRAEPITHELIAL LESION ON CYTOLOGIC SMEAR OF CERVIX (LGSIL): ICD-10-CM

## 2019-06-24 PROCEDURE — 57454 BX/CURETT OF CERVIX W/SCOPE: CPT | Performed by: OBSTETRICS & GYNECOLOGY

## 2019-06-24 NOTE — PROGRESS NOTES
Subjective   Tracey Vines is a 74 y.o. female is being seen today for   Chief Complaint   Patient presents with   • Colposcopy     LGSIL, positive HR-HPV on pap.    .    History of Present Illness  Patient is here for colposcopy for repeatedly abnormal Pap since last one was a level 3 with HPV positive she has had this quite a long time now but never beyond the level 3.  No other complaints today  The following portions of the patient's history were reviewed and updated as appropriate: allergies, current medications, past family history, past medical history, past social history, past surgical history and problem list.    There were no vitals filed for this visit.      PAST MEDICAL HISTORY  Past Medical History:   Diagnosis Date   • A-fib (CMS/HCC)    • CHF (congestive heart failure) (CMS/HCC)    • Depression    • Disease of thyroid gland    • Hyperlipidemia    • Hypertension      OB History      Para Term  AB Living    3 2 2   1 2    SAB TAB Ectopic Molar Multiple Live Births    1         2        Past Surgical History:   Procedure Laterality Date   • BREAST BIOPSY     • CARDIAC SURGERY     • CHOLECYSTECTOMY     • THYROID SURGERY     • TONSILLECTOMY       Family History   Problem Relation Age of Onset   • Diabetes Mother    • Heart failure Mother    • Stroke Mother    • Cancer Father    • No Known Problems Sister    • No Known Problems Brother    • No Known Problems Son    • No Known Problems Maternal Grandmother    • No Known Problems Paternal Grandmother    • No Known Problems Maternal Aunt    • No Known Problems Paternal Aunt    • No Known Problems Son    • BRCA 1/2 Neg Hx    • Breast cancer Neg Hx    • Colon cancer Neg Hx    • Endometrial cancer Neg Hx    • Ovarian cancer Neg Hx      Social History     Tobacco Use   Smoking Status Former Smoker   Smokeless Tobacco Never Used       Current Outpatient Medications:   •  aspirin (ASPIR-LOW) 81 MG EC tablet, , Disp: , Rfl:   •  atorvastatin (LIPITOR)  40 MG tablet, Take 40 mg by mouth daily., Disp: , Rfl:   •  Bioflavonoid Products (BRITTANY-C) 500-200-60 MG tablet, , Disp: , Rfl:   •  carvedilol (COREG) 12.5 MG tablet, Take 12.5 mg by mouth 2 (two) times a day with meals., Disp: , Rfl:   •  Cholecalciferol (VITAMIN D) 1000 units tablet, , Disp: , Rfl:   •  digoxin (LANOXIN) 125 MCG tablet, Take 125 mcg by mouth every day., Disp: , Rfl:   •  diltiaZEM (CARDIZEM) 30 MG tablet, TAKE 1 TABLET BY MOUTH THREE TIMES DAILY, Disp: , Rfl:   •  furosemide (LASIX) 40 MG tablet, Take 40 mg by mouth 2 (Two) Times a Day., Disp: , Rfl:   •  levothyroxine (SYNTHROID, LEVOTHROID) 112 MCG tablet, Take 112 mcg by mouth Daily., Disp: , Rfl:   •  losartan (COZAAR) 25 MG tablet, Take 25 mg by mouth daily., Disp: , Rfl:   •  melatonin 5 MG tablet tablet, , Disp: , Rfl:   •  methylcellulose, Laxative, (CITRUCEL) 500 MG tablet tablet, , Disp: , Rfl:   •  potassium chloride (K-DUR,KLOR-CON) 10 MEQ CR tablet, TK 1 T PO QD, Disp: , Rfl: 3  •  spironolactone (ALDACTONE) 25 MG tablet, Take 25 mg by mouth daily., Disp: , Rfl:   •  venlafaxine XR (EFFEXOR-XR) 150 MG 24 hr capsule, Take 150 mg by mouth daily., Disp: , Rfl:   •  warfarin (COUMADIN) 2.5 MG tablet, Take 2.5 mg by mouth daily., Disp: , Rfl:   Immunization History   Administered Date(s) Administered   • Pneumococcal Polysaccharide (PPSV23) 12/26/2017   • TD Preservative Free 05/06/2016       Review of Systems  Negative  Objective   Physical Exam  Colposcopy was done in the routine fashion with acetic acid.  Patient has a stenotic asked.  I do not see anything to obvious on the outside just some very mild white epithelium.  I did a Pap using the brush for the endocervix and a Spira brush on the outside ectocervix.  Patient tolerated procedure very well.    Assessment/Plan   Tracey was seen today for colposcopy.    Diagnoses and all orders for this visit:    Cervical high risk HPV (human papillomavirus) test positive  -     Colposcopy  -      PapIG, HPV, Rfx 16 / 18  -     Reference Histopathology    Pap smear for cervical cancer screening  -     PapIG, HPV, Rfx 16 / 18    Screening for HPV (human papillomavirus)  -     PapIG, HPV, Rfx 16 / 18    Abnormal cytological finding in specimen from cervix uteri   -     PapIG, HPV, Rfx 16 / 18  -     Reference Histopathology    Low grade squamous intraepithelial lesion on cytologic smear of cervix (LGSIL)    Results to be communicated

## 2019-06-28 LAB
CYTOLOGIST CVX/VAG CYTO: NORMAL
CYTOLOGY CVX/VAG DOC CYTO: NORMAL
CYTOLOGY CVX/VAG DOC THIN PREP: NORMAL
DX ICD CODE: NORMAL
HIV 1 & 2 AB SER-IMP: NORMAL
HPV I/H RISK 1 DNA CVX QL PROBE+SIG AMP: NORMAL
HPV I/H RISK 4 DNA CVX QL PROBE+SIG AMP: POSITIVE
Lab: NORMAL
OTHER STN SPEC: NORMAL
PATH REPORT.FINAL DX SPEC: NORMAL
PATH REPORT.GROSS SPEC: NORMAL
PATH REPORT.SITE OF ORIGIN SPEC: NORMAL
PATHOLOGIST NAME: NORMAL
PAYMENT PROCEDURE: NORMAL
STAT OF ADQ CVX/VAG CYTO-IMP: NORMAL

## 2019-07-02 ENCOUNTER — TELEPHONE (OUTPATIENT)
Dept: OBSTETRICS AND GYNECOLOGY | Age: 74
End: 2019-07-02

## 2019-07-02 NOTE — TELEPHONE ENCOUNTER
----- Message from Thomas Rand MD sent at 7/1/2019  8:10 AM EDT -----  Tell patient the inside portion of what we did the other day was negative the outside shows some mildly atypical looks like liver to let us have her repeat this in 6 months just a Pap

## 2019-09-12 ENCOUNTER — TELEPHONE (OUTPATIENT)
Dept: CARDIAC SURGERY | Facility: CLINIC | Age: 74
End: 2019-09-12

## 2019-09-12 NOTE — TELEPHONE ENCOUNTER
Ms. Vines called stating that she has a raised place on the bottom of her sternal incision from her surgery with Dr. Burris from 4-13-15. She states the wound is not open or does it cause her pain but she states that it does cause her discomfort and it is soft and she can push it in. I made her an appointment with Dr. Burris for 9-16-19 @ 11a for a sternal assessment. She agreed and confirmed time and date.

## 2019-09-16 ENCOUNTER — OFFICE VISIT (OUTPATIENT)
Dept: CARDIAC SURGERY | Facility: CLINIC | Age: 74
End: 2019-09-16

## 2019-09-16 ENCOUNTER — HOSPITAL ENCOUNTER (OUTPATIENT)
Dept: GENERAL RADIOLOGY | Facility: HOSPITAL | Age: 74
Discharge: HOME | End: 2019-09-16
Admitting: PHYSICIAN ASSISTANT

## 2019-09-16 VITALS
DIASTOLIC BLOOD PRESSURE: 62 MMHG | BODY MASS INDEX: 22.02 KG/M2 | HEIGHT: 66 IN | TEMPERATURE: 98 F | SYSTOLIC BLOOD PRESSURE: 106 MMHG | OXYGEN SATURATION: 97 % | WEIGHT: 137 LBS | RESPIRATION RATE: 20 BRPM | HEART RATE: 65 BPM

## 2019-09-16 DIAGNOSIS — M89.8X8 STERNAL MASS: ICD-10-CM

## 2019-09-16 DIAGNOSIS — Z98.890 S/P TVR (TRICUSPID VALVE REPAIR): ICD-10-CM

## 2019-09-16 DIAGNOSIS — Z86.79 S/P MAZE OPERATION FOR ATRIAL FIBRILLATION: ICD-10-CM

## 2019-09-16 DIAGNOSIS — Z95.1 S/P CABG X 1: ICD-10-CM

## 2019-09-16 DIAGNOSIS — Z95.2 S/P AVR (AORTIC VALVE REPLACEMENT): Primary | ICD-10-CM

## 2019-09-16 DIAGNOSIS — Z98.890 S/P MVR (MITRAL VALVE REPAIR): ICD-10-CM

## 2019-09-16 DIAGNOSIS — Z98.890 S/P MAZE OPERATION FOR ATRIAL FIBRILLATION: ICD-10-CM

## 2019-09-16 DIAGNOSIS — M89.8X8 STERNAL MASS: Primary | ICD-10-CM

## 2019-09-16 PROCEDURE — 71046 X-RAY EXAM CHEST 2 VIEWS: CPT

## 2019-09-16 PROCEDURE — 99211 OFF/OP EST MAY X REQ PHY/QHP: CPT | Performed by: PHYSICIAN ASSISTANT

## 2019-09-16 RX ORDER — LEVOTHYROXINE SODIUM 0.12 MG/1
125 TABLET ORAL DAILY
COMMUNITY

## 2019-09-17 ENCOUNTER — TELEPHONE (OUTPATIENT)
Dept: CARDIAC SURGERY | Facility: CLINIC | Age: 74
End: 2019-09-17

## 2019-09-17 NOTE — TELEPHONE ENCOUNTER
After speaking with Samantha LOVE I called to discuss results of yesterdays x-ray with the patient. The x-ray showed no broken sternal wires and nothing out of the ordinary. Dr Burris has reviewed the x-ray and thinks the mass is likely a cyst. He recommends following this and if the size changes or it becomes bothersome he can remove this with outpatient surgery. Patient verbalized understanding and will call back with any changes or concerns

## 2019-09-17 NOTE — PROGRESS NOTES
"CARDIOVASCULAR SURGERY FOLLOW-UP PROGRESS NOTE  Chief Complaint: Wound check        HPI:   Dear Dr. Cardenas, Domenica Ocampo MD and colleagues:    It was nice to see Tracey Vines in follow up today after cardiac surgery.  As you know, she is a 74 y.o. female who underwent CABG x 1, AVR with a 21 mm [employer] Trifecta bovine pericardial valve, mitral valve repair with a 28 mm Medtronic 3D ring, tricuspid valve repair with a 28 mm [employer] Medtronic ring, and Maze procedure at [facility] by Dr. Burris on 4/9/2015. She did well postoperatively and continues to do well. She comes in today complaining of a \"lump/mass\" on her sternum. She states it sometimes feels like something is \"pulling,\" but isn't exactly painful. She denies trauma to her chest or feelings of popping, clicking, or movement.      Physical Exam:         /62 (BP Location: Right arm, Patient Position: Sitting, Cuff Size: Adult)   Pulse 65   Temp 98 °F (36.7 °C) (Oral)   Resp 20   Ht 167.6 cm (66\")   Wt 62.1 kg (137 lb)   LMP  (LMP Unknown) Comment: NO HRT  SpO2 97%   BMI 22.11 kg/m²   Heart:  regular rate and rhythm, S1, S2 normal, no murmur, click, rub or gallop  Lungs:  clear to auscultation bilaterally  Extremities:  no edema  Incision(s):  mid chest well healed, sternum stable, pea sized firm mobile mass at the lower body/upper xiphoid of the sternum    Assessment/Plan:     S/P CABG, AVR, mitral valve repair, tricuspid valve repair and Maze procedure.     Possible cyst over the inferior portion of the sternum.      I can't say that the mass feels like a sternal wire, but we will order a CXR to see if her sternal wires are intact. It's possible that a sternal wire could be broken and protruding. We will call her with the findings and go from there. She voiced understanding.     Thank you for allowing me to participate in the care of your patient.    Regards,  KATE Whitehead       ADDENDUM: CXR shows sternal " wires to be fully intact. The mobile mass might likely be a cyst overlying her sternum. I discussed her results with Dr. Burris and he said if the mass is bothering her, we could consider removing it. I discussed this with our office nurse, Marcelo Rahman, who is going to call Ms. Vines and see how she would like to proceed.

## 2019-09-20 ENCOUNTER — TELEPHONE (OUTPATIENT)
Dept: CARDIAC SURGERY | Facility: CLINIC | Age: 74
End: 2019-09-20

## 2019-09-20 NOTE — TELEPHONE ENCOUNTER
After speaking with patient I spoke with Dr Burris who will remove the mass which he thinks is likely a cyst. He will do this in the operating room. I let patient know this . She prefers to think about it and continue to monitor this small mass on her sternum. She will call back if her condition changes or she has further questions

## 2019-12-05 ENCOUNTER — PROCEDURE VISIT (OUTPATIENT)
Dept: OBSTETRICS AND GYNECOLOGY | Age: 74
End: 2019-12-05

## 2019-12-05 ENCOUNTER — APPOINTMENT (OUTPATIENT)
Dept: WOMENS IMAGING | Facility: HOSPITAL | Age: 74
End: 2019-12-05

## 2019-12-05 ENCOUNTER — OFFICE VISIT (OUTPATIENT)
Dept: OBSTETRICS AND GYNECOLOGY | Age: 74
End: 2019-12-05

## 2019-12-05 VITALS
HEIGHT: 66 IN | WEIGHT: 137.4 LBS | BODY MASS INDEX: 22.08 KG/M2 | SYSTOLIC BLOOD PRESSURE: 110 MMHG | DIASTOLIC BLOOD PRESSURE: 70 MMHG

## 2019-12-05 DIAGNOSIS — Z12.31 VISIT FOR SCREENING MAMMOGRAM: Primary | ICD-10-CM

## 2019-12-05 DIAGNOSIS — Z12.4 ROUTINE CERVICAL SMEAR: Primary | ICD-10-CM

## 2019-12-05 DIAGNOSIS — R87.610 ATYPICAL SQUAMOUS CELLS OF UNDETERMINED SIGNIFICANCE ON CYTOLOGIC SMEAR OF CERVIX (ASC-US): ICD-10-CM

## 2019-12-05 DIAGNOSIS — Z11.51 SPECIAL SCREENING EXAMINATION FOR HUMAN PAPILLOMAVIRUS (HPV): ICD-10-CM

## 2019-12-05 DIAGNOSIS — Z00.00 ANNUAL PHYSICAL EXAM: ICD-10-CM

## 2019-12-05 PROBLEM — R87.612 LOW GRADE SQUAMOUS INTRAEPITHELIAL LESION ON CYTOLOGIC SMEAR OF CERVIX (LGSIL): Status: RESOLVED | Noted: 2019-06-24 | Resolved: 2019-12-05

## 2019-12-05 PROBLEM — N95.0 POSTMENOPAUSAL BLEEDING: Status: RESOLVED | Noted: 2018-10-16 | Resolved: 2019-12-05

## 2019-12-05 PROCEDURE — 99397 PER PM REEVAL EST PAT 65+ YR: CPT | Performed by: OBSTETRICS & GYNECOLOGY

## 2019-12-05 PROCEDURE — 77067 SCR MAMMO BI INCL CAD: CPT | Performed by: OBSTETRICS & GYNECOLOGY

## 2019-12-05 PROCEDURE — 77067 SCR MAMMO BI INCL CAD: CPT | Performed by: RADIOLOGY

## 2019-12-05 NOTE — PROGRESS NOTES
Subjective   Tracey Vines is a 74 y.o. female is being seen today for   Chief Complaint   Patient presents with   • Gynecologic Exam     pap 2019, MG 2018, DEXA 6-7 yrs ago, C-scope 2019   .    History of Present Illness  Patient is here for annual check and a repeat Pap smear.  She has had abnormal Paps a while 6 months ago colpo was done and level 2 essentially with HPV.  She has no specific complaints today.  Nothing new in the family does have new twin grandsons 8 months old.  Her bowels and bladder work well.  There is still watching a course everything with a heart the lungs and salt is very stable.  No other complaints  The following portions of the patient's history were reviewed and updated as appropriate: allergies, current medications, past family history, past medical history, past social history, past surgical history and problem list.    Vitals:    19 1033   BP: 110/70       PAST MEDICAL HISTORY  Past Medical History:   Diagnosis Date   • A-fib (CMS/HCC)    • CHF (congestive heart failure) (CMS/HCC)    • Depression    • Disease of thyroid gland    • Hyperlipidemia    • Hypertension      OB History      Para Term  AB Living    3 2 2   1 2    SAB TAB Ectopic Molar Multiple Live Births    1         2        Past Surgical History:   Procedure Laterality Date   • BREAST BIOPSY     • CARDIAC SURGERY     • CHOLECYSTECTOMY     • THYROID SURGERY     • TONSILLECTOMY       Family History   Problem Relation Age of Onset   • Diabetes Mother    • Heart failure Mother    • Stroke Mother    • Cancer Father    • No Known Problems Sister    • No Known Problems Brother    • No Known Problems Son    • No Known Problems Maternal Grandmother    • No Known Problems Paternal Grandmother    • No Known Problems Maternal Aunt    • No Known Problems Paternal Aunt    • No Known Problems Son    • BRCA 1/2 Neg Hx    • Breast cancer Neg Hx    • Colon cancer Neg Hx    • Endometrial cancer Neg Hx    • Ovarian  cancer Neg Hx      Social History     Tobacco Use   Smoking Status Former Smoker   Smokeless Tobacco Never Used       Current Outpatient Medications:   •  aspirin (ASPIR-LOW) 81 MG EC tablet, , Disp: , Rfl:   •  atorvastatin (LIPITOR) 40 MG tablet, Take 40 mg by mouth daily., Disp: , Rfl:   •  Bioflavonoid Products (BRITTANY-C) 500-200-60 MG tablet, , Disp: , Rfl:   •  carvedilol (COREG) 12.5 MG tablet, Take 12.5 mg by mouth 2 (two) times a day with meals., Disp: , Rfl:   •  Cholecalciferol (VITAMIN D) 1000 units tablet, , Disp: , Rfl:   •  digoxin (LANOXIN) 125 MCG tablet, Take 125 mcg by mouth every day., Disp: , Rfl:   •  diltiaZEM (CARDIZEM) 30 MG tablet, TAKE 1 TABLET BY MOUTH THREE TIMES DAILY, Disp: , Rfl:   •  furosemide (LASIX) 40 MG tablet, Take 40 mg by mouth 2 (Two) Times a Day., Disp: , Rfl:   •  levothyroxine (SYNTHROID, LEVOTHROID) 125 MCG tablet, Take 125 mcg by mouth Daily., Disp: , Rfl:   •  losartan (COZAAR) 25 MG tablet, Take 25 mg by mouth daily., Disp: , Rfl:   •  melatonin 5 MG tablet tablet, , Disp: , Rfl:   •  methylcellulose, Laxative, (CITRUCEL) 500 MG tablet tablet, , Disp: , Rfl:   •  potassium chloride (K-DUR,KLOR-CON) 10 MEQ CR tablet, TK 1 T PO QD, Disp: , Rfl: 3  •  spironolactone (ALDACTONE) 25 MG tablet, Take 25 mg by mouth daily., Disp: , Rfl:   •  venlafaxine XR (EFFEXOR-XR) 150 MG 24 hr capsule, Take 150 mg by mouth daily., Disp: , Rfl:   •  warfarin (COUMADIN) 2.5 MG tablet, Take 2.5 mg by mouth daily., Disp: , Rfl:   Immunization History   Administered Date(s) Administered   • Pneumococcal Polysaccharide (PPSV23) 12/26/2017   • TD Preservative Free 05/06/2016       Review of Systems   Constitutional: Negative for chills, fatigue, fever and unexpected weight change.   Respiratory: Negative for shortness of breath and wheezing.    Cardiovascular: Negative for chest pain.   Gastrointestinal: Negative for abdominal distention, abdominal pain, blood in stool, constipation, diarrhea  and nausea.   Genitourinary: Negative for difficulty urinating, dyspareunia, dysuria, frequency, hematuria, menstrual problem, pelvic pain, urgency and vaginal discharge.   Skin: Negative for rash.       Objective   Physical Exam   Constitutional: She is oriented to person, place, and time. Vital signs are normal. She appears well-developed and well-nourished.   Neck: No thyromegaly present.   Cardiovascular: Normal rate, regular rhythm and normal heart sounds.   Pulmonary/Chest: Effort normal. Right breast exhibits no inverted nipple, no mass, no nipple discharge, no skin change and no tenderness. Left breast exhibits no inverted nipple, no mass, no nipple discharge, no skin change and no tenderness. Breasts are symmetrical. There is no breast swelling.   Abdominal: Soft.   Genitourinary: Vagina normal and uterus normal. No breast tenderness, discharge or bleeding. Pelvic exam was performed with patient supine. No labial fusion. There is no rash, tenderness, lesion or injury on the right labia. There is no rash, tenderness, lesion or injury on the left labia. Cervix exhibits no motion tenderness, no discharge and no friability. Right adnexum displays no mass, no tenderness and no fullness. Left adnexum displays no mass, no tenderness and no fullness.   Neurological: She is alert and oriented to person, place, and time.   Psychiatric: She has a normal mood and affect.   Vitals reviewed.        Assessment/Plan   Tracey was seen today for gynecologic exam.    Diagnoses and all orders for this visit:    Routine cervical smear  -     IGP, Aptima HPV, Rfx 16 / 18,45    Special screening examination for human papillomavirus (HPV)  -     IGP, Aptima HPV, Rfx 16 / 18,45    Annual physical exam    Atypical squamous cells of undetermined significance on cytologic smear of cervix (ASC-US)      Normal exam today.  Pap smear done today.  Mammogram being done today.  Will repeat a bone density in 6 months along with a Pap.   Colonoscopy in 15 was negative.  She walks she stays active and I will see her in 6 months for Pap and bone density  She also mention lately she has felt that her stomach more is prominent.  She has gained some weight and have an ultrasound from a year ago totally negative as well as my exam today.  I told her she still thinks the problem she has noted and and if she does not think is related to her bowels then she needs to talk to PCP and possibly get a CT scan to look for any abnormal growth.  She will pay attention to that during this month

## 2019-12-10 LAB
CYTOLOGIST CVX/VAG CYTO: ABNORMAL
CYTOLOGY CVX/VAG DOC CYTO: ABNORMAL
CYTOLOGY CVX/VAG DOC THIN PREP: ABNORMAL
DX ICD CODE: ABNORMAL
DX ICD CODE: ABNORMAL
HIV 1 & 2 AB SER-IMP: ABNORMAL
HPV I/H RISK 4 DNA CVX QL PROBE+SIG AMP: POSITIVE
OTHER STN SPEC: ABNORMAL
PATHOLOGIST CVX/VAG CYTO: ABNORMAL
STAT OF ADQ CVX/VAG CYTO-IMP: ABNORMAL

## 2019-12-16 ENCOUNTER — TELEPHONE (OUTPATIENT)
Dept: OBSTETRICS AND GYNECOLOGY | Age: 74
End: 2019-12-16

## 2019-12-16 NOTE — TELEPHONE ENCOUNTER
----- Message from Thomas Rand MD sent at 12/16/2019 11:50 AM EST -----  Tell patient Pap smear showed level 2 with still positive HPV we will just check again in 6 months

## 2020-01-09 ENCOUNTER — TELEPHONE (OUTPATIENT)
Dept: URGENT CARE | Facility: CLINIC | Age: 75
End: 2020-01-09

## 2020-01-09 NOTE — TELEPHONE ENCOUNTER
----- Message from Miguel Angel Ortiz MD sent at 1/9/2020  7:05 AM EST -----  Urine culture negative. Can stop the antibiotic. If still having symptoms, need to follow up with PMD.

## 2020-06-10 ENCOUNTER — PROCEDURE VISIT (OUTPATIENT)
Dept: OBSTETRICS AND GYNECOLOGY | Age: 75
End: 2020-06-10

## 2020-06-10 VITALS
BODY MASS INDEX: 21.82 KG/M2 | SYSTOLIC BLOOD PRESSURE: 102 MMHG | HEIGHT: 67 IN | DIASTOLIC BLOOD PRESSURE: 48 MMHG | WEIGHT: 139 LBS

## 2020-06-10 DIAGNOSIS — B97.7 HPV (HUMAN PAPILLOMA VIRUS) INFECTION: ICD-10-CM

## 2020-06-10 DIAGNOSIS — M85.89 OSTEOPENIA OF MULTIPLE SITES: ICD-10-CM

## 2020-06-10 DIAGNOSIS — Z12.31 VISIT FOR SCREENING MAMMOGRAM: ICD-10-CM

## 2020-06-10 DIAGNOSIS — Z78.0 POST-MENOPAUSAL: Primary | ICD-10-CM

## 2020-06-10 DIAGNOSIS — R87.610 ATYPICAL SQUAMOUS CELLS OF UNDETERMINED SIGNIFICANCE ON CYTOLOGIC SMEAR OF CERVIX (ASC-US): ICD-10-CM

## 2020-06-10 DIAGNOSIS — Z12.4 ROUTINE CERVICAL SMEAR: Primary | ICD-10-CM

## 2020-06-10 DIAGNOSIS — Z11.51 SPECIAL SCREENING EXAMINATION FOR HUMAN PAPILLOMAVIRUS (HPV): ICD-10-CM

## 2020-06-10 PROBLEM — G47.33 OSA (OBSTRUCTIVE SLEEP APNEA): Status: ACTIVE | Noted: 2020-06-10

## 2020-06-10 PROBLEM — Z86.79 HISTORY OF CHF (CONGESTIVE HEART FAILURE): Status: ACTIVE | Noted: 2020-06-10

## 2020-06-10 PROBLEM — D69.6 DECREASED PLATELET COUNT (HCC): Status: ACTIVE | Noted: 2019-05-30

## 2020-06-10 PROCEDURE — 99212 OFFICE O/P EST SF 10 MIN: CPT | Performed by: OBSTETRICS & GYNECOLOGY

## 2020-06-10 PROCEDURE — 77080 DXA BONE DENSITY AXIAL: CPT | Performed by: OBSTETRICS & GYNECOLOGY

## 2020-06-10 NOTE — PROGRESS NOTES
Subjective   Tracey Vines is a 75 y.o. female is being seen today for   Chief Complaint   Patient presents with   • Gynecologic Exam     Pap 2019, MG 2019, DEXA today, C-scope    .    History of Present Illness  Patient is here for 6-month follow-up for an atypical Pap smear.  She is at level 2 mostly with positive HPV.  No complaints the last 6 months nothing changed in terms of her heart disease or medicines.  And will get a bone density today  The following portions of the patient's history were reviewed and updated as appropriate: allergies, current medications, past family history, past medical history, past social history, past surgical history and problem list.    Vitals:    06/10/20 0922   BP: 102/48         PAST MEDICAL HISTORY  Past Medical History:   Diagnosis Date   • A-fib (CMS/HCC)    • CHF (congestive heart failure) (CMS/[facility])    • Depression    • Disease of thyroid gland    • Hyperlipidemia    • Hypertension      OB History        3    Para   2    Term   2            AB   1    Living   2       SAB   1    TAB        Ectopic        Molar        Multiple        Live Births   2              Past Surgical History:   Procedure Laterality Date   • BREAST BIOPSY         • CARDIAC SURGERY      THREE VALVES - 1 REPLACED 2 REPAIRED   • THYROID SURGERY     • TONSILLECTOMY       Family History   Problem Relation Age of Onset   • Diabetes Mother    • Heart failure Mother    • Stroke Mother    • Cancer Father    • No Known Problems Sister    • No Known Problems Brother    • No Known Problems Son    • No Known Problems Maternal Grandmother    • No Known Problems Paternal Grandmother    • No Known Problems Maternal Aunt    • No Known Problems Paternal Aunt    • No Known Problems Son    • BRCA 1/2 Neg Hx    • Breast cancer Neg Hx    • Colon cancer Neg Hx    • Endometrial cancer Neg Hx    • Ovarian cancer Neg Hx      Social History     Tobacco Use   Smoking Status Former Smoker   Smokeless  Tobacco Never Used       Current Outpatient Medications:   •  aspirin (ASPIR-LOW) 81 MG EC tablet, , Disp: , Rfl:   •  atorvastatin (LIPITOR) 40 MG tablet, Take 40 mg by mouth daily., Disp: , Rfl:   •  Bioflavonoid Products (BRITTANY-C) 500-200-60 MG tablet, , Disp: , Rfl:   •  carvedilol (COREG) 12.5 MG tablet, Take 12.5 mg by mouth 2 (two) times a day with meals., Disp: , Rfl:   •  Cholecalciferol (VITAMIN D) 1000 units tablet, , Disp: , Rfl:   •  digoxin (LANOXIN) 125 MCG tablet, Take 125 mcg by mouth every day., Disp: , Rfl:   •  diltiaZEM (CARDIZEM) 30 MG tablet, TAKE 1 TABLET BY MOUTH THREE TIMES DAILY, Disp: , Rfl:   •  furosemide (LASIX) 40 MG tablet, Take 40 mg by mouth 2 (Two) Times a Day., Disp: , Rfl:   •  levothyroxine (SYNTHROID, LEVOTHROID) 125 MCG tablet, Take 125 mcg by mouth Daily., Disp: , Rfl:   •  losartan (COZAAR) 25 MG tablet, Take 25 mg by mouth daily., Disp: , Rfl:   •  melatonin 5 MG tablet tablet, , Disp: , Rfl:   •  potassium chloride (K-DUR,KLOR-CON) 10 MEQ CR tablet, TK 1 T PO QD, Disp: , Rfl: 3  •  spironolactone (ALDACTONE) 25 MG tablet, Take 25 mg by mouth daily., Disp: , Rfl:   •  venlafaxine XR (EFFEXOR-XR) 150 MG 24 hr capsule, Take 150 mg by mouth daily., Disp: , Rfl:   •  warfarin (COUMADIN) 2.5 MG tablet, Take 2.5 mg by mouth daily., Disp: , Rfl:   Immunization History   Administered Date(s) Administered   • Pneumococcal Polysaccharide (PPSV23) 12/26/2017   • TD Preservative Free 05/06/2016       Review of Systems  Negative  Objective   Physical Exam  Pelvic exam done with a Pap smear  Assessment/Plan   Tracey was seen today for gynecologic exam.    Diagnoses and all orders for this visit:    Routine cervical smear  -     IGP, Aptima HPV, Rfx 16 / 18,45    Special screening examination for human papillomavirus (HPV)  -     IGP, Aptima HPV, Rfx 16 / 18,45    HPV (human papilloma virus) infection    Atypical squamous cells of undetermined significance on cytologic smear of cervix  (ASC-US)    Osteopenia of multiple sites        We also went over the bone density today I do not have an old one to compare to the hip shows osteopenia -2.0 in the spine was a bit of a false positive L3L4 she said some scoliosis but L1-L2 are really the same as a hip -1 9-1 2.2 so it is that she has osteopenia we talked about that for now calcium vitamin D and exercise repeat in 2 years and will come back for the Pap in 6 months for an annual.

## 2020-06-15 LAB
CYTOLOGIST CVX/VAG CYTO: ABNORMAL
CYTOLOGY CVX/VAG DOC CYTO: ABNORMAL
CYTOLOGY CVX/VAG DOC THIN PREP: ABNORMAL
DX ICD CODE: ABNORMAL
DX ICD CODE: ABNORMAL
HIV 1 & 2 AB SER-IMP: ABNORMAL
HPV I/H RISK 4 DNA CVX QL PROBE+SIG AMP: POSITIVE
OTHER STN SPEC: ABNORMAL
PATHOLOGIST CVX/VAG CYTO: ABNORMAL
RECOM F/U CVX/VAG CYTO: ABNORMAL
STAT OF ADQ CVX/VAG CYTO-IMP: ABNORMAL

## 2020-06-18 ENCOUNTER — TELEPHONE (OUTPATIENT)
Dept: OBSTETRICS AND GYNECOLOGY | Age: 75
End: 2020-06-18

## 2020-06-18 NOTE — TELEPHONE ENCOUNTER
Pt is scheduled with you in Dec for 6 mo f/u-repeat pap. However, Dr. Rand states she needs a colpo in 3 mos rather than just a repeat pap in 6 mos. Will you do a colpo the first appt with you or do you want her to have two different appts?

## 2020-06-18 NOTE — TELEPHONE ENCOUNTER
Patient had a level 3 with positive HPV still would like to get a colpo and probably not wait 6 months

## 2020-06-18 NOTE — TELEPHONE ENCOUNTER
----- Message from Tracey Vines sent at 6/17/2020  1:41 PM EDT -----  Regarding: Non-Urgent Medical Question  Contact: [phone]  Dr Rand, I see that my test results are in for HPV.  At your convenience, please advise the number associated with this test.  Thank you!

## 2020-06-19 NOTE — TELEPHONE ENCOUNTER
Per Dr. Castillo:  Pt needs colpo in 6 weeks. She is okay with first appt with her being a colpo. Will call pt to schedule on Monday morning

## 2020-06-22 NOTE — TELEPHONE ENCOUNTER
Pt okay with 8/7/20 at 330 (spot okay per Shireen) Pt will need 30 mins.     Linda, can you add her in? New gyn-colpo

## 2020-07-06 ENCOUNTER — TELEPHONE (OUTPATIENT)
Dept: CARDIAC SURGERY | Facility: CLINIC | Age: 75
End: 2020-07-06

## 2020-07-06 NOTE — TELEPHONE ENCOUNTER
Patient was last seen for post-op visit with Samantha on 6/16/2019 at which time a CXR was ordered.  Dr. Burris reviewed and determined mass was likely a cyst on her sternum that would need to be further evaluated if the size change or becomes bothersome.  The ocation is just above the top end of her incision.  Patient is calling back now wanting to know if Dr. Burris would be willing to remove.

## 2020-07-06 NOTE — TELEPHONE ENCOUNTER
Spoke with Dr. Burris.  He would like for patient to be seen in office to evaluate further to determine best course of action/next steps.  annw

## 2020-07-10 ENCOUNTER — OFFICE VISIT (OUTPATIENT)
Dept: CARDIAC SURGERY | Facility: CLINIC | Age: 75
End: 2020-07-10

## 2020-07-10 VITALS
OXYGEN SATURATION: 96 % | SYSTOLIC BLOOD PRESSURE: 111 MMHG | WEIGHT: 139.4 LBS | DIASTOLIC BLOOD PRESSURE: 66 MMHG | BODY MASS INDEX: 21.88 KG/M2 | HEIGHT: 67 IN | RESPIRATION RATE: 16 BRPM | HEART RATE: 60 BPM

## 2020-07-10 DIAGNOSIS — Z95.2 S/P AVR (AORTIC VALVE REPLACEMENT): ICD-10-CM

## 2020-07-10 DIAGNOSIS — Z98.890 H/O MITRAL VALVE REPAIR: Primary | ICD-10-CM

## 2020-07-10 DIAGNOSIS — Z98.890 H/O TRICUSPID VALVE REPAIR: ICD-10-CM

## 2020-07-10 PROCEDURE — 99024 POSTOP FOLLOW-UP VISIT: CPT | Performed by: THORACIC SURGERY (CARDIOTHORACIC VASCULAR SURGERY)

## 2020-07-10 RX ORDER — CALCIUM CARBONATE 500(1250)
TABLET ORAL
COMMUNITY
Start: 2020-06-15

## 2020-07-10 NOTE — PROGRESS NOTES
I am seeing her back in the office.  She is 5 years after aortic valve replacement with a bovine trifecta valve CABG x1 mitral valve repair and tricuspid valve repair with a Maze procedure.  She feels well and has no symptoms.  She has a little subcutaneous nodule at the lower portion of her sternum that bothers her when she wears an underwire bra.  It is a little area of scarring.  It is not a wire.  It really does not bother her that much and we discussed having it removed and at this point time she would just want to leave it alone.  It is really of about 1/2 cm nodule of scar tissue and is not a significant problem.  Otherwise she has no symptoms of heart failure and feels well.  She has developed some large veins across the top of her sternum which is probably postoperatively.  We will see her back as needed.

## 2020-08-07 ENCOUNTER — OFFICE VISIT (OUTPATIENT)
Dept: OBSTETRICS AND GYNECOLOGY | Age: 75
End: 2020-08-07

## 2020-08-07 VITALS
SYSTOLIC BLOOD PRESSURE: 110 MMHG | WEIGHT: 139 LBS | BODY MASS INDEX: 21.82 KG/M2 | HEIGHT: 67 IN | DIASTOLIC BLOOD PRESSURE: 69 MMHG

## 2020-08-07 DIAGNOSIS — Z98.890 H/O LEEP: ICD-10-CM

## 2020-08-07 DIAGNOSIS — Z87.410 PERSONAL HISTORY OF CERVICAL DYSPLASIA: ICD-10-CM

## 2020-08-07 DIAGNOSIS — R87.610 ATYPICAL SQUAMOUS CELLS OF UNDETERMINED SIGNIFICANCE ON CYTOLOGIC SMEAR OF CERVIX (ASC-US): Primary | ICD-10-CM

## 2020-08-07 DIAGNOSIS — B97.7 HPV IN FEMALE: ICD-10-CM

## 2020-08-07 PROCEDURE — 99214 OFFICE O/P EST MOD 30 MIN: CPT | Performed by: OBSTETRICS & GYNECOLOGY

## 2020-08-07 RX ORDER — CALCIUM CARBONATE 200(500)MG
1000 TABLET,CHEWABLE ORAL DAILY
COMMUNITY

## 2020-08-07 NOTE — PROGRESS NOTES
"Subjective   Tracey Vines is a 75 y.o. female new patient to me (former patient of DR. Rand) who presents for colpo due to  last pap 06/10/20 LSIL hpv pos.      Patient reportedly has been dealing with abnormal pap smears since her last relationship in . Denies any sexual activity since that time and she is not a smoker.  She reports she had a LEEP - BRANDIE I margins negative - procedure at [facility] in  and paps still continued to be abnormal.  Plan to refer to Gyn Onc for input on mgmt plan as patient is 75 with cervix flush with vagina.      Review of records reveals the followin/2009 - LGSIL  LEEP   2012 - ASCUS HPV +   3/2013 - ASCUS  2013 - NIL   3/2014 - NIL   2014 - LGSIL   2016 - ASCUS HPV +   2017 - NIL   10/2017 - NIL   10/2018 - LGSIL   2018 - NIL   2019 - LGSIL   2019 - ASCUS - HPV +      History of Present Illness    The following portions of the patient's history were reviewed and updated as appropriate: allergies, current medications, past family history, past medical history, past social history, past surgical history and problem list.    Review of Systems   Constitutional: Negative for chills, fatigue and fever.   Gastrointestinal: Negative for abdominal distention and abdominal pain.   Genitourinary: Negative for dysuria, hematuria, pelvic pain, vaginal bleeding, vaginal discharge and vaginal pain.   All other systems reviewed and are negative.  /69   Ht 170.2 cm (67\")   Wt 63 kg (139 lb)   LMP  (LMP Unknown) Comment: NO HRT  Breastfeeding No   BMI 21.77 kg/m²       Objective   Physical Exam   Constitutional: She is oriented to person, place, and time. She appears well-developed and well-nourished.   Pulmonary/Chest: Effort normal.   Genitourinary: Pelvic exam was performed with patient supine.   Genitourinary Comments: Stenotic cervix flush with vagina   Neurological: She is alert and oriented to person, place, and time.   Skin: Skin is warm " and dry.   Psychiatric: She has a normal mood and affect. Her behavior is normal.   Nursing note and vitals reviewed.        Assessment/Plan   Tracey was seen today for establish care and gynecologic exam.    Diagnoses and all orders for this visit:    Atypical squamous cells of undetermined significance on cytologic smear of cervix (ASC-US)    HPV in female      Counseling was given to patient for the following topics: diagnostic results, instructions for management, prognosis, impressions, importance of treatment compliance and referral to Gyn Onc  . Total time of the encounter was 25 minutes and 20 minutes was spend counseling.

## 2020-08-10 PROBLEM — Z87.410 PERSONAL HISTORY OF CERVICAL DYSPLASIA: Status: ACTIVE | Noted: 2020-08-10

## 2020-08-10 PROBLEM — Z98.890 H/O LEEP: Status: ACTIVE | Noted: 2020-08-10

## 2020-08-10 NOTE — PROGRESS NOTES
Procedure   Procedures    Colposcopy Procedure Note    Indications: Pap smear 2 months ago showed: low-grade squamous intraepithelial neoplasia (LGSIL - encompassing HPV,mild dysplasia,BRANDIE I). The prior pap showed ASCUS with POSITIVE high risk HPV.  Prior cervical/vaginal disease: BRANDIE 1. Prior cervical treatment: LEEP.    Procedure Details   The risks and benefits of the procedure and Written informed consent obtained.    Speculum placed in vagina and excellent visualization of cervix achieved, cervix swabbed x 3 with acetic acid solution.    Findings:  Cervix: no visible lesions; Unable to perform colpo due to no visualization of TZ and cervix flush with vagina.   Vaginal inspection: vaginal colposcopy not performed.  Vulvar colposcopy: vulvar colposcopy not performed.    Specimens: none    Complications: none.  Patient tolerated the procedure well without complications.    Plan:  Referral to Gyn Onc for input on management.

## 2020-09-30 ENCOUNTER — TELEPHONE (OUTPATIENT)
Dept: OBSTETRICS AND GYNECOLOGY | Age: 75
End: 2020-09-30

## 2020-09-30 NOTE — TELEPHONE ENCOUNTER
Please call patient and reschedule her appt with Isabel or other mid-level provider as she was mistakenly scheduled with me and I do not have openings. Please apologize.

## 2020-10-14 ENCOUNTER — TELEPHONE (OUTPATIENT)
Dept: OBSTETRICS AND GYNECOLOGY | Age: 75
End: 2020-10-14

## 2021-01-04 ENCOUNTER — OFFICE VISIT (OUTPATIENT)
Dept: OBSTETRICS AND GYNECOLOGY | Age: 76
End: 2021-01-04

## 2021-01-04 VITALS
SYSTOLIC BLOOD PRESSURE: 110 MMHG | WEIGHT: 135 LBS | BODY MASS INDEX: 21.19 KG/M2 | DIASTOLIC BLOOD PRESSURE: 64 MMHG | HEIGHT: 67 IN

## 2021-01-04 DIAGNOSIS — Z87.410 PERSONAL HISTORY OF CERVICAL DYSPLASIA: ICD-10-CM

## 2021-01-04 DIAGNOSIS — B97.7 HPV IN FEMALE: ICD-10-CM

## 2021-01-04 DIAGNOSIS — Z90.710 H/O TOTAL HYSTERECTOMY: ICD-10-CM

## 2021-01-04 DIAGNOSIS — Z01.419 WELL WOMAN EXAM WITH ROUTINE GYNECOLOGICAL EXAM: Primary | ICD-10-CM

## 2021-01-04 DIAGNOSIS — M85.89 OSTEOPENIA OF MULTIPLE SITES: ICD-10-CM

## 2021-01-04 DIAGNOSIS — R87.619 UNSPECIFIED ABNORMAL CYTOLOGICAL FINDINGS IN SPECIMENS FROM CERVIX UTERI: ICD-10-CM

## 2021-01-04 DIAGNOSIS — Z98.890 H/O LEEP: ICD-10-CM

## 2021-01-04 PROCEDURE — G0101 CA SCREEN;PELVIC/BREAST EXAM: HCPCS | Performed by: NURSE PRACTITIONER

## 2021-01-04 NOTE — PROGRESS NOTES
Subjective     Chief Complaint   Patient presents with   • Gynecologic Exam     LAST PAP 06/10/2020 LSIL/ HPV POS, M/G 2019       History of Present Illness    Tracey Vines is a 75 y.o.  who presents for annual exam.    Pt presents today for annual exam  She was seen in August by Dr. Castillo for colposcopy  She was then referred to Dr. Gonzalez (Gyn/Onc) for persistent dysplasia s/p leep in   She underwent total robotic hysterectomy bilateral salpingectomy oophorectomy in September     Last colonoscopy was in   DEXA in  - Osteopenia of hip, lumbar spine normal. She is taking vitamin D and calcium  Sees PCP (chanelle sol) regularly - last saw in November   She has no other concerns or complaints today  Pt of Dr. Castillo      Obstetric History:  OB History        3    Para   2    Term   2       0    AB   1    Living   2       SAB   1    TAB   0    Ectopic   0    Molar   0    Multiple   0    Live Births   2               Menstrual History:     No LMP recorded (lmp unknown). Patient has had a hysterectomy.         Current contraception: status post hysterectomy  History of abnormal Pap smear: yes - see hx  Received Gardasil immunization: no  Perform regular self breast exam: yes - regularly   Family history of uterine or ovarian cancer: no  Family History of colon cancer: no  Family history of breast cancer: no    Mammogram: ordered.  Colonoscopy: up to date.  DEXA: up to date.    Exercise: moderately active  Calcium/Vitamin D: uses supplements    The following portions of the patient's history were reviewed and updated as appropriate: allergies, current medications, past family history, past medical history, past social history, past surgical history and problem list.    Review of Systems   Constitutional: Negative.    Respiratory: Negative.    Cardiovascular: Negative.    Gastrointestinal: Negative.    Genitourinary: Negative.    Skin: Negative.    Psychiatric/Behavioral: Negative.             Objective   Physical Exam  Constitutional:       General: She is awake.      Appearance: Normal appearance. She is well-developed.   HENT:      Head: Normocephalic and atraumatic.      Nose: Nose normal.   Neck:      Musculoskeletal: Normal range of motion and neck supple.      Thyroid: No thyroid mass, thyromegaly or thyroid tenderness.   Cardiovascular:      Rate and Rhythm: Normal rate and regular rhythm.      Pulses: Normal pulses.      Heart sounds: Normal heart sounds.   Pulmonary:      Effort: Pulmonary effort is normal.      Breath sounds: Normal breath sounds.   Chest:      Breasts: Breasts are symmetrical.         Right: Normal. No swelling, bleeding, inverted nipple, mass, nipple discharge, skin change or tenderness.         Left: Normal. No swelling, bleeding, inverted nipple, mass, nipple discharge, skin change or tenderness.   Abdominal:      General: Abdomen is flat. Bowel sounds are normal.      Palpations: Abdomen is soft.      Tenderness: There is no abdominal tenderness.   Genitourinary:     General: Normal vulva.      Labia:         Right: No rash, tenderness, lesion or injury.         Left: No rash, tenderness, lesion or injury.       Urethra: No prolapse, urethral pain, urethral swelling or urethral lesion.      Vagina: Normal. No signs of injury. No vaginal discharge, erythema, tenderness, bleeding, lesions or prolapsed vaginal walls.      Adnexa:         Right: No mass, tenderness or fullness.          Left: No mass, tenderness or fullness.        Rectum: Normal. No mass.      Comments: Uterus, cervix and ovaries absent   Lymphadenopathy:      Upper Body:      Right upper body: No supraclavicular adenopathy.      Left upper body: No supraclavicular adenopathy.   Skin:     General: Skin is warm and dry.   Neurological:      General: No focal deficit present.      Mental Status: She is alert and oriented to person, place, and time.   Psychiatric:         Mood and Affect: Mood normal.   "       Behavior: Behavior normal. Behavior is cooperative.         Thought Content: Thought content normal.         Judgment: Judgment normal.         /64   Ht 170.2 cm (67\")   Wt 61.2 kg (135 lb)   LMP  (LMP Unknown) Comment: NO HRT  BMI 21.14 kg/m²     Assessment/Plan   Diagnoses and all orders for this visit:    1. Well woman exam with routine gynecological exam (Primary)  -     IgP, Aptima HPV    2. H/O LEEP    3. HPV in female  -     IgP, Aptima HPV    4. Personal history of cervical dysplasia  -     IgP, Aptima HPV    5. Osteopenia of multiple sites    6. Unspecified abnormal cytological findings in specimens from cervix uteri   -     IgP, Aptima HPV    7. H/O total hysterectomy        All questions answered.  Breast self exam technique reviewed and patient encouraged to perform self-exam monthly.  Discussed healthy lifestyle modifications.  Recommended 30 minutes of aerobic exercise five times per week.  Discussed calcium and vitamin D needs to prevent osteoporosis.    -Pap smear was done due to patient history   -Will schedule mammogram prior to leaving today  -C-scopy up to date   -Dexa up to date  -F/u yearly, sooner prn               "

## 2021-01-07 ENCOUNTER — TELEPHONE (OUTPATIENT)
Dept: OBSTETRICS AND GYNECOLOGY | Age: 76
End: 2021-01-07

## 2021-01-07 LAB
CYTOLOGIST CVX/VAG CYTO: NORMAL
CYTOLOGY CVX/VAG DOC CYTO: NORMAL
CYTOLOGY CVX/VAG DOC THIN PREP: NORMAL
DX ICD CODE: NORMAL
HIV 1 & 2 AB SER-IMP: NORMAL
HPV I/H RISK 4 DNA CVX QL PROBE+SIG AMP: NEGATIVE
Lab: NORMAL
OTHER STN SPEC: NORMAL
STAT OF ADQ CVX/VAG CYTO-IMP: NORMAL

## 2021-01-07 NOTE — TELEPHONE ENCOUNTER
----- Message from FRANCO Negrete sent at 1/7/2021 12:39 PM EST -----  Notify patient that pap smear was normal, neg hpv

## 2021-03-02 DIAGNOSIS — Z23 IMMUNIZATION DUE: ICD-10-CM

## 2021-04-14 ENCOUNTER — APPOINTMENT (OUTPATIENT)
Dept: WOMENS IMAGING | Facility: HOSPITAL | Age: 76
End: 2021-04-14

## 2021-04-14 ENCOUNTER — OFFICE VISIT (OUTPATIENT)
Dept: OBSTETRICS AND GYNECOLOGY | Age: 76
End: 2021-04-14

## 2021-04-14 DIAGNOSIS — Z12.31 VISIT FOR SCREENING MAMMOGRAM: Primary | ICD-10-CM

## 2021-04-14 PROCEDURE — 77067 SCR MAMMO BI INCL CAD: CPT | Performed by: RADIOLOGY

## 2021-04-14 PROCEDURE — 77067 SCR MAMMO BI INCL CAD: CPT | Performed by: NURSE PRACTITIONER
